# Patient Record
Sex: MALE | Race: WHITE | NOT HISPANIC OR LATINO | Employment: OTHER | URBAN - NONMETROPOLITAN AREA
[De-identification: names, ages, dates, MRNs, and addresses within clinical notes are randomized per-mention and may not be internally consistent; named-entity substitution may affect disease eponyms.]

---

## 2017-01-11 RX ORDER — SIMVASTATIN 40 MG
TABLET ORAL
Qty: 90 TABLET | Refills: 2 | Status: SHIPPED | OUTPATIENT
Start: 2017-01-11 | End: 2018-01-26 | Stop reason: SDUPTHER

## 2017-01-11 RX ORDER — LISINOPRIL 5 MG/1
TABLET ORAL
Qty: 30 TABLET | Refills: 2 | Status: SHIPPED | OUTPATIENT
Start: 2017-01-11 | End: 2017-04-15 | Stop reason: SDUPTHER

## 2017-03-22 ENCOUNTER — PREP FOR SURGERY (OUTPATIENT)
Dept: CARDIOLOGY | Facility: CLINIC | Age: 67
End: 2017-03-22

## 2017-03-22 ENCOUNTER — OFFICE VISIT (OUTPATIENT)
Dept: CARDIOLOGY | Facility: CLINIC | Age: 67
End: 2017-03-22

## 2017-03-22 ENCOUNTER — HOSPITAL ENCOUNTER (OUTPATIENT)
Facility: HOSPITAL | Age: 67
Setting detail: HOSPITAL OUTPATIENT SURGERY
End: 2017-03-22
Attending: INTERNAL MEDICINE | Admitting: INTERNAL MEDICINE

## 2017-03-22 VITALS
HEIGHT: 66 IN | HEART RATE: 59 BPM | SYSTOLIC BLOOD PRESSURE: 128 MMHG | BODY MASS INDEX: 36 KG/M2 | DIASTOLIC BLOOD PRESSURE: 80 MMHG | WEIGHT: 224 LBS

## 2017-03-22 DIAGNOSIS — I10 ESSENTIAL HYPERTENSION: ICD-10-CM

## 2017-03-22 DIAGNOSIS — I20.0 UNSTABLE ANGINA (HCC): ICD-10-CM

## 2017-03-22 DIAGNOSIS — R07.2 PRECORDIAL PAIN: ICD-10-CM

## 2017-03-22 DIAGNOSIS — I25.2 OLD MYOCARDIAL INFARCT: ICD-10-CM

## 2017-03-22 DIAGNOSIS — R00.1 SINUS BRADYCARDIA: ICD-10-CM

## 2017-03-22 DIAGNOSIS — I20.0 UNSTABLE ANGINA (HCC): Primary | ICD-10-CM

## 2017-03-22 DIAGNOSIS — I25.10 CHRONIC CORONARY ARTERY DISEASE: Primary | ICD-10-CM

## 2017-03-22 PROCEDURE — 93000 ELECTROCARDIOGRAM COMPLETE: CPT | Performed by: INTERNAL MEDICINE

## 2017-03-22 PROCEDURE — 99214 OFFICE O/P EST MOD 30 MIN: CPT | Performed by: INTERNAL MEDICINE

## 2017-03-22 RX ORDER — FAMOTIDINE 10 MG
20 TABLET ORAL ONCE
Status: CANCELLED | OUTPATIENT
Start: 2017-03-22 | End: 2017-03-22

## 2017-03-22 RX ORDER — ISOSORBIDE MONONITRATE 30 MG/1
30 TABLET, EXTENDED RELEASE ORAL EVERY MORNING
Qty: 30 TABLET | Refills: 6 | Status: SHIPPED | OUTPATIENT
Start: 2017-03-22 | End: 2017-05-03

## 2017-03-22 RX ORDER — DIPHENHYDRAMINE HCL 25 MG
50 CAPSULE ORAL 2 TIMES DAILY
Status: CANCELLED | OUTPATIENT
Start: 2017-03-22 | End: 2017-03-23

## 2017-03-22 RX ORDER — PREDNISONE 1 MG/1
60 TABLET ORAL 2 TIMES DAILY
Status: CANCELLED | OUTPATIENT
Start: 2017-03-29 | End: 2017-03-30

## 2017-03-22 RX ORDER — SODIUM CHLORIDE 9 MG/ML
125 INJECTION, SOLUTION INTRAVENOUS CONTINUOUS
Status: CANCELLED | OUTPATIENT
Start: 2017-03-22

## 2017-03-22 RX ORDER — SODIUM CHLORIDE 9 MG/ML
125 INJECTION, SOLUTION INTRAVENOUS CONTINUOUS
Status: CANCELLED | OUTPATIENT
Start: 2017-03-29

## 2017-03-22 RX ORDER — SODIUM CHLORIDE 0.9 % (FLUSH) 0.9 %
1-10 SYRINGE (ML) INJECTION AS NEEDED
Status: CANCELLED | OUTPATIENT
Start: 2017-03-22

## 2017-03-22 RX ORDER — PREDNISONE 20 MG/1
20 TABLET ORAL DAILY
Qty: 6 TABLET | Refills: 1 | Status: SHIPPED | OUTPATIENT
Start: 2017-03-22 | End: 2017-03-30 | Stop reason: HOSPADM

## 2017-03-22 NOTE — PROGRESS NOTES
Justin Baer  66 y.o. male    03/22/2017  1. Chronic coronary artery disease    2. Essential hypertension    3. Sinus bradycardia    4. Old myocardial infarct    5. Precordial pain        History of Present Illness    Mr. Baer is here for follow-up of his above stated problems.  He reports intermittent episodes of chest pain during the past week which occurs at rest and remind similar to pain that he has had with angina in the past.  The pain at times radiates to his neck.  There is some degree of shortness of breath on exertion with no PND or orthopnea.  He has been compliant with his medications.  His history was reviewed in detail and he has had intervention to left anterior deciding coronary artery and left circumflex coronary artery in the past.  His last intervention was in March 2013 when he underwent dilation of the ostial circumflex stent with a noncompliant balloon by me.  He has done reasonably well on medical management for the past 4 years.  He denied any abdominal pain, nausea, vomiting, diarrhea or melena.  Blood pressure was in the normal range.  EKG showed sinus rhythm heart rate of 59 bpm with minimal nonspecific T-wave changes.        SUBJECTIVE    Allergies   Allergen Reactions   • Anaprox [Naproxen Sodium]    • Ibuprofen    • Iodinated Diagnostic Agents    • Iodine          Past Medical History:   Diagnosis Date   • Angina pectoris    • Dyspnea    • Essential hypertension, malignant    • Hyperlipidemia    • Kidney stone    • Myocardial infarction    • Palpitations    • Renal stones    • Ventricular fibrillation          Past Surgical History:   Procedure Laterality Date   • APPENDECTOMY     • CARDIAC CATHETERIZATION     • CORONARY ANGIOPLASTY     • CORONARY STENT PLACEMENT     • HERNIA REPAIR     • KIDNEY STONE SURGERY     • VASECTOMY           No family history on file.      Social History     Social History   • Marital status:      Spouse name: N/A   • Number of children: N/A   •  "Years of education: N/A     Occupational History   • Not on file.     Social History Main Topics   • Smoking status: Former Smoker   • Smokeless tobacco: Never Used   • Alcohol use No   • Drug use: No   • Sexual activity: Defer     Other Topics Concern   • Not on file     Social History Narrative         Current Outpatient Prescriptions   Medication Sig Dispense Refill   • aspirin 81 MG EC tablet Take 81 mg by mouth Daily.     • BRILINTA 90 MG tablet tablet Take 90 mg by mouth 2 (Two) Times a Day.     • lisinopril (PRINIVIL,ZESTRIL) 5 MG tablet TAKE ONE TABLET BY MOUTH DAILY AS NEEDED 30 tablet 2   • pantoprazole (PROTONIX) 40 MG EC tablet Take 40 mg by mouth Daily.     • RANEXA 500 MG 12 hr tablet TAKE ONE TABLET BY MOUTH TWICE A DAY 60 tablet 5   • simvastatin (ZOCOR) 40 MG tablet TAKE ONE TABLET BY MOUTH EVERY EVENING 90 tablet 2   • isosorbide mononitrate (IMDUR) 30 MG 24 hr tablet Take 1 tablet by mouth Every Morning. 30 tablet 6     No current facility-administered medications for this visit.          OBJECTIVE    /80  Pulse 59  Ht 66\" (167.6 cm)  Wt 224 lb (102 kg)  BMI 36.15 kg/m2        Review of Systems     Constitutional:  Denies recent weight loss, weight gain, fever or chills,    HENT: c/o  hearing loss, epistaxis, hoarseness, or difficulty speaking.     Eyes: Wears eyeglasses or contact lenses     Respiratory:  Denies dyspnea with exertion,no cough, wheezing, or hemoptysis.     Cardiovascular: See history of present illness     Gastrointestinal:  Denies change in bowel habits, dyspepsia, ulcer disease, hematochezia, or melena.     Endocrine: Negative for cold intolerance, heat intolerance, polydipsia, polyphagia and polyuria. Denies any history of weight change, heat/cold intolerance, polydipsia, polyuria     Genitourinary: Negative.      Musculoskeletal: Denies any history of arthritic symptoms or back problems     Skin:  Denies any change in hair or nails, rashes, or skin lesions. "     Allergic/Immunologic: Negative.  Negative for environmental allergies, food allergies and immunocompromised state.     Neurological:  Denies any history of recurrent headaches, strokes, TIA, or seizure disorder.     Hematological: Denies any food allergies, seasonal allergies, bleeding disorders, or lymphadenopathy.     Psychiatric/Behavioral: Denies any history of depression, substance abuse, or change in cognitive function.         Physical Exam     Constitutional: Cooperative, alert and oriented, well-developed, well-nourished, in no acute distress.     HENT:   Head: Normocephalic, normal hair patterns, no masses or tenderness.  Ears, Nose, and Throat: No gross abnormalities. No pallor or cyanosis. Dentition good.   Eyes: EOMS intact, PERRL, conjunctivae and lids unremarkable. Fundoscopic exam and visual fields not performed.   Neck: No palpable masses or adenopathy, no thyromegaly, no JVD, carotid pulses are full and equal bilaterally and without  Bruits.     Cardiovascular: Regular rhythm, S1 and S2 normal, no S3 or S4. Apical impulse not displaced. No murmurs, gallops, or rubs detected.     Pulmonary/Chest: Chest: normal symmetry, no tenderness to palpation, normal respiratory excursion, no intercostal retraction, no use of accessory muscles.            Pulmonary: Normal breath sounds. No rales or ronchi.    Abdominal: Abdomen soft, bowel sounds normoactive, no masses, no hepatosplenomegaly, non-tender, no bruits.     Musculoskeletal: No deformities, clubbing, cyanosis, erythema, or edema observed. There are no spinal abnormalities noted. Normal muscle strength and tone. Pulses full and equal in all extremities, no bruits auscultated.     Neurological: No gross motor or sensory deficits noted, affect appropriate, oriented to time, person, place.     Skin: Warm and dry to the touch, no apparent skin lesions or masses noted.     Psychiatric: He has a normal mood and affect. His behavior is normal. Judgment  and thought content normal.           ECG 12 Lead  Date/Time: 3/22/2017 1:44 PM  Performed by: JAGDISH ESPINOSA  Authorized by: JAGDISH ESPINOSA   Rhythm: sinus rhythm  Rate: normal  QRS axis: normal  Comments: EKG showed sinus rhythm heart rate of 59 bpm with minimal nonspecific T-wave changes              Lab Results   Component Value Date    WBC 5.5 10/09/2015    HGB 13.9 10/09/2015    HCT 40.7 10/09/2015    MCV 91.9 10/09/2015     10/09/2015     Lab Results   Component Value Date    GLUCOSE 89 10/09/2015    BUN 16 10/09/2015    CREATININE 0.9 10/09/2015    CO2 29 10/09/2015    CALCIUM 9.7 10/09/2015    ALBUMIN 3.9 10/09/2015    AST 25 10/09/2015    ALT 30 10/09/2015     No results found for: CHOL  Lab Results   Component Value Date    TRIG 93 10/09/2015     No results found for: HDL  Lab Results   Component Value Date    LDLCALC 65 10/09/2015     No results found for: LDL  No results found for: HDLLDLRATIO  No components found for: CHOLHDL  No results found for: HGBA1C  Lab Results   Component Value Date    TSH 1.70 04/10/2015           ASSESSMENT AND PLAN    Mr. Baer has documented coronary artery disease in the background of hypertension and hyperlipidemia and previous intervention to left anterior descending coronary artery and left circumflex coronary artery.  His chest pain is suspicious for unstable angina and I believe that definitive evaluation by cardiac catheterization would be appropriate.  All risks and benefits were explained to him in detail and arrangements are being made for him to have cardiac catheterization next week.  He will be an ASA class II and Mallampati class II.  Dr. Ochoa will be performing the procedure since I will be out of town.  Diagnoses and all orders for this visit:    Chronic coronary artery disease    Essential hypertension    Sinus bradycardia    Old myocardial infarct    Precordial pain    Other orders  -     isosorbide mononitrate (IMDUR) 30  MG 24 hr tablet; Take 1 tablet by mouth Every Morning.        Javi Landry MD  3/22/2017  1:38 PM

## 2017-03-27 ENCOUNTER — HOSPITAL ENCOUNTER (OUTPATIENT)
Facility: HOSPITAL | Age: 67
Setting detail: OBSERVATION
Discharge: HOME | End: 2017-03-30
Attending: EMERGENCY MEDICINE | Admitting: INTERNAL MEDICINE

## 2017-03-27 ENCOUNTER — APPOINTMENT (OUTPATIENT)
Dept: GENERAL RADIOLOGY | Facility: HOSPITAL | Age: 67
End: 2017-03-27

## 2017-03-27 DIAGNOSIS — R07.2 PRECORDIAL PAIN: ICD-10-CM

## 2017-03-27 DIAGNOSIS — R07.9 CHEST PAIN, UNSPECIFIED TYPE: Primary | ICD-10-CM

## 2017-03-27 DIAGNOSIS — I25.10 CHRONIC CORONARY ARTERY DISEASE: ICD-10-CM

## 2017-03-27 DIAGNOSIS — I10 ESSENTIAL HYPERTENSION: ICD-10-CM

## 2017-03-27 LAB
ALBUMIN SERPL-MCNC: 4.3 G/DL (ref 3.4–4.8)
ALBUMIN/GLOB SERPL: 1.5 G/DL (ref 1.1–1.8)
ALP SERPL-CCNC: 100 U/L (ref 38–126)
ALT SERPL W P-5'-P-CCNC: 26 U/L (ref 21–72)
ANION GAP SERPL CALCULATED.3IONS-SCNC: 14 MMOL/L (ref 5–15)
AST SERPL-CCNC: 27 U/L (ref 17–59)
BASOPHILS # BLD AUTO: 0.04 10*3/MM3 (ref 0–0.2)
BASOPHILS NFR BLD AUTO: 0.8 % (ref 0–2)
BILIRUB SERPL-MCNC: 0.7 MG/DL (ref 0.2–1.3)
BUN BLD-MCNC: 19 MG/DL (ref 7–21)
BUN/CREAT SERPL: 20.2 (ref 7–25)
CALCIUM SPEC-SCNC: 9.2 MG/DL (ref 8.4–10.2)
CHLORIDE SERPL-SCNC: 102 MMOL/L (ref 95–110)
CO2 SERPL-SCNC: 25 MMOL/L (ref 22–31)
CREAT BLD-MCNC: 0.94 MG/DL (ref 0.7–1.3)
DEPRECATED RDW RBC AUTO: 43.5 FL (ref 35.1–43.9)
EOSINOPHIL # BLD AUTO: 0.09 10*3/MM3 (ref 0–0.7)
EOSINOPHIL NFR BLD AUTO: 1.7 % (ref 0–7)
ERYTHROCYTE [DISTWIDTH] IN BLOOD BY AUTOMATED COUNT: 13 % (ref 11.5–14.5)
GFR SERPL CREATININE-BSD FRML MDRD: 80 ML/MIN/1.73 (ref 49–113)
GLOBULIN UR ELPH-MCNC: 2.9 GM/DL (ref 2.3–3.5)
GLUCOSE BLD-MCNC: 87 MG/DL (ref 60–100)
HCT VFR BLD AUTO: 41.5 % (ref 39–49)
HGB BLD-MCNC: 14.2 G/DL (ref 13.7–17.3)
HOLD SPECIMEN: NORMAL
HOLD SPECIMEN: NORMAL
IMM GRANULOCYTES # BLD: 0.01 10*3/MM3 (ref 0–0.02)
IMM GRANULOCYTES NFR BLD: 0.2 % (ref 0–0.5)
INR PPP: 1.01 (ref 0.8–1.2)
LYMPHOCYTES # BLD AUTO: 2.02 10*3/MM3 (ref 0.6–4.2)
LYMPHOCYTES NFR BLD AUTO: 38.7 % (ref 10–50)
MCH RBC QN AUTO: 31.4 PG (ref 26.5–34)
MCHC RBC AUTO-ENTMCNC: 34.2 G/DL (ref 31.5–36.3)
MCV RBC AUTO: 91.8 FL (ref 80–98)
MONOCYTES # BLD AUTO: 0.63 10*3/MM3 (ref 0–0.9)
MONOCYTES NFR BLD AUTO: 12.1 % (ref 0–12)
NEUTROPHILS # BLD AUTO: 2.43 10*3/MM3 (ref 2–8.6)
NEUTROPHILS NFR BLD AUTO: 46.5 % (ref 37–80)
NT-PROBNP SERPL-MCNC: 72.9 PG/ML (ref 0–900)
PLATELET # BLD AUTO: 205 10*3/MM3 (ref 150–450)
PMV BLD AUTO: 11.2 FL (ref 8–12)
POTASSIUM BLD-SCNC: 4.2 MMOL/L (ref 3.5–5.1)
PROT SERPL-MCNC: 7.2 G/DL (ref 6.3–8.6)
PROTHROMBIN TIME: 13.2 SECONDS (ref 11.1–15.3)
RBC # BLD AUTO: 4.52 10*6/MM3 (ref 4.37–5.74)
SODIUM BLD-SCNC: 141 MMOL/L (ref 137–145)
TROPONIN I SERPL-MCNC: <0.012 NG/ML
TSH SERPL DL<=0.05 MIU/L-ACNC: 2.9 MIU/ML (ref 0.46–4.68)
WBC NRBC COR # BLD: 5.22 10*3/MM3 (ref 3.2–9.8)
WHOLE BLOOD HOLD SPECIMEN: NORMAL
WHOLE BLOOD HOLD SPECIMEN: NORMAL

## 2017-03-27 PROCEDURE — G0378 HOSPITAL OBSERVATION PER HR: HCPCS

## 2017-03-27 PROCEDURE — 93010 ELECTROCARDIOGRAM REPORT: CPT | Performed by: INTERNAL MEDICINE

## 2017-03-27 PROCEDURE — 99284 EMERGENCY DEPT VISIT MOD MDM: CPT

## 2017-03-27 PROCEDURE — 84484 ASSAY OF TROPONIN QUANT: CPT | Performed by: NURSE PRACTITIONER

## 2017-03-27 PROCEDURE — 85025 COMPLETE CBC W/AUTO DIFF WBC: CPT | Performed by: NURSE PRACTITIONER

## 2017-03-27 PROCEDURE — 80053 COMPREHEN METABOLIC PANEL: CPT | Performed by: NURSE PRACTITIONER

## 2017-03-27 PROCEDURE — 84443 ASSAY THYROID STIM HORMONE: CPT | Performed by: NURSE PRACTITIONER

## 2017-03-27 PROCEDURE — 93005 ELECTROCARDIOGRAM TRACING: CPT | Performed by: NURSE PRACTITIONER

## 2017-03-27 PROCEDURE — 83880 ASSAY OF NATRIURETIC PEPTIDE: CPT | Performed by: NURSE PRACTITIONER

## 2017-03-27 PROCEDURE — 85610 PROTHROMBIN TIME: CPT | Performed by: NURSE PRACTITIONER

## 2017-03-27 PROCEDURE — 71020 HC CHEST PA AND LATERAL: CPT

## 2017-03-27 PROCEDURE — 93005 ELECTROCARDIOGRAM TRACING: CPT

## 2017-03-27 RX ORDER — DIPHENHYDRAMINE HCL 25 MG
50 TABLET ORAL AS NEEDED
Status: DISCONTINUED | OUTPATIENT
Start: 2017-03-27 | End: 2017-03-29

## 2017-03-27 RX ORDER — ATORVASTATIN CALCIUM 20 MG/1
20 TABLET, FILM COATED ORAL DAILY
Status: DISCONTINUED | OUTPATIENT
Start: 2017-03-27 | End: 2017-03-30 | Stop reason: HOSPADM

## 2017-03-27 RX ORDER — ASPIRIN 81 MG/1
81 TABLET ORAL DAILY
Status: DISCONTINUED | OUTPATIENT
Start: 2017-03-27 | End: 2017-03-30 | Stop reason: HOSPADM

## 2017-03-27 RX ORDER — NALOXONE HCL 0.4 MG/ML
0.4 VIAL (ML) INJECTION
Status: DISCONTINUED | OUTPATIENT
Start: 2017-03-27 | End: 2017-03-29

## 2017-03-27 RX ORDER — ISOSORBIDE MONONITRATE 30 MG/1
30 TABLET, EXTENDED RELEASE ORAL EVERY MORNING
Status: DISCONTINUED | OUTPATIENT
Start: 2017-03-28 | End: 2017-03-30 | Stop reason: HOSPADM

## 2017-03-27 RX ORDER — MORPHINE SULFATE 2 MG/ML
1 INJECTION, SOLUTION INTRAMUSCULAR; INTRAVENOUS EVERY 4 HOURS PRN
Status: DISCONTINUED | OUTPATIENT
Start: 2017-03-27 | End: 2017-03-29

## 2017-03-27 RX ORDER — ONDANSETRON 2 MG/ML
4 INJECTION INTRAMUSCULAR; INTRAVENOUS EVERY 6 HOURS PRN
Status: DISCONTINUED | OUTPATIENT
Start: 2017-03-27 | End: 2017-03-30 | Stop reason: HOSPADM

## 2017-03-27 RX ORDER — ONDANSETRON 4 MG/1
4 TABLET, FILM COATED ORAL EVERY 6 HOURS PRN
Status: DISCONTINUED | OUTPATIENT
Start: 2017-03-27 | End: 2017-03-30 | Stop reason: HOSPADM

## 2017-03-27 RX ORDER — ONDANSETRON 4 MG/1
4 TABLET, ORALLY DISINTEGRATING ORAL EVERY 6 HOURS PRN
Status: DISCONTINUED | OUTPATIENT
Start: 2017-03-27 | End: 2017-03-30 | Stop reason: HOSPADM

## 2017-03-27 RX ORDER — SODIUM CHLORIDE 0.9 % (FLUSH) 0.9 %
1-10 SYRINGE (ML) INJECTION AS NEEDED
Status: DISCONTINUED | OUTPATIENT
Start: 2017-03-27 | End: 2017-03-30 | Stop reason: HOSPADM

## 2017-03-27 RX ORDER — PANTOPRAZOLE SODIUM 40 MG/1
40 TABLET, DELAYED RELEASE ORAL DAILY
Status: DISCONTINUED | OUTPATIENT
Start: 2017-03-27 | End: 2017-03-28

## 2017-03-27 RX ORDER — RANOLAZINE 500 MG/1
500 TABLET, EXTENDED RELEASE ORAL EVERY 12 HOURS SCHEDULED
Status: DISCONTINUED | OUTPATIENT
Start: 2017-03-27 | End: 2017-03-30 | Stop reason: HOSPADM

## 2017-03-27 RX ORDER — PREDNISONE 20 MG/1
20 TABLET ORAL DAILY
Status: DISCONTINUED | OUTPATIENT
Start: 2017-03-27 | End: 2017-03-28

## 2017-03-27 RX ORDER — LISINOPRIL 5 MG/1
5 TABLET ORAL
Status: DISCONTINUED | OUTPATIENT
Start: 2017-03-27 | End: 2017-03-30 | Stop reason: HOSPADM

## 2017-03-27 RX ORDER — ASPIRIN 81 MG/1
324 TABLET, CHEWABLE ORAL ONCE
Status: COMPLETED | OUTPATIENT
Start: 2017-03-27 | End: 2017-03-27

## 2017-03-27 RX ORDER — NITROGLYCERIN 0.4 MG/1
0.4 TABLET SUBLINGUAL
Status: DISCONTINUED | OUTPATIENT
Start: 2017-03-27 | End: 2017-03-30 | Stop reason: HOSPADM

## 2017-03-27 RX ORDER — SODIUM CHLORIDE 0.9 % (FLUSH) 0.9 %
10 SYRINGE (ML) INJECTION AS NEEDED
Status: DISCONTINUED | OUTPATIENT
Start: 2017-03-27 | End: 2017-03-30 | Stop reason: HOSPADM

## 2017-03-27 RX ORDER — ACETAMINOPHEN 325 MG/1
650 TABLET ORAL EVERY 4 HOURS PRN
Status: DISCONTINUED | OUTPATIENT
Start: 2017-03-27 | End: 2017-03-30 | Stop reason: HOSPADM

## 2017-03-27 RX ADMIN — ASPIRIN 81 MG 324 MG: 81 TABLET ORAL at 17:26

## 2017-03-27 RX ADMIN — PANTOPRAZOLE SODIUM 40 MG: 40 TABLET, DELAYED RELEASE ORAL at 20:56

## 2017-03-27 RX ADMIN — TICAGRELOR 90 MG: 90 TABLET ORAL at 20:57

## 2017-03-27 RX ADMIN — RANOLAZINE 500 MG: 500 TABLET, FILM COATED, EXTENDED RELEASE ORAL at 20:57

## 2017-03-28 PROCEDURE — 93010 ELECTROCARDIOGRAM REPORT: CPT | Performed by: INTERNAL MEDICINE

## 2017-03-28 PROCEDURE — 99219 PR INITIAL OBSERVATION CARE/DAY 50 MINUTES: CPT | Performed by: INTERNAL MEDICINE

## 2017-03-28 PROCEDURE — 93005 ELECTROCARDIOGRAM TRACING: CPT | Performed by: INTERNAL MEDICINE

## 2017-03-28 PROCEDURE — 63710000001 PREDNISONE PER 1 MG: Performed by: INTERNAL MEDICINE

## 2017-03-28 PROCEDURE — G0378 HOSPITAL OBSERVATION PER HR: HCPCS

## 2017-03-28 PROCEDURE — 63710000001 DIPHENHYDRAMINE PER 50 MG: Performed by: NURSE PRACTITIONER

## 2017-03-28 RX ORDER — PREDNISONE 20 MG/1
20 TABLET ORAL
Status: COMPLETED | OUTPATIENT
Start: 2017-03-28 | End: 2017-03-28

## 2017-03-28 RX ORDER — PREDNISONE 20 MG/1
20 TABLET ORAL DAILY
Status: DISCONTINUED | OUTPATIENT
Start: 2017-03-29 | End: 2017-03-29

## 2017-03-28 RX ORDER — PANTOPRAZOLE SODIUM 40 MG/1
40 TABLET, DELAYED RELEASE ORAL DAILY
Status: COMPLETED | OUTPATIENT
Start: 2017-03-28 | End: 2017-03-28

## 2017-03-28 RX ADMIN — TICAGRELOR 90 MG: 90 TABLET ORAL at 09:13

## 2017-03-28 RX ADMIN — PANTOPRAZOLE SODIUM 40 MG: 40 TABLET, DELAYED RELEASE ORAL at 17:52

## 2017-03-28 RX ADMIN — TICAGRELOR 90 MG: 90 TABLET ORAL at 17:52

## 2017-03-28 RX ADMIN — PREDNISONE 20 MG: 20 TABLET ORAL at 17:52

## 2017-03-28 RX ADMIN — ASPIRIN 81 MG: 81 TABLET, COATED ORAL at 09:13

## 2017-03-28 RX ADMIN — RANOLAZINE 500 MG: 500 TABLET, FILM COATED, EXTENDED RELEASE ORAL at 09:13

## 2017-03-28 RX ADMIN — RANOLAZINE 500 MG: 500 TABLET, FILM COATED, EXTENDED RELEASE ORAL at 20:29

## 2017-03-28 RX ADMIN — LISINOPRIL 5 MG: 5 TABLET ORAL at 09:13

## 2017-03-28 RX ADMIN — DIPHENHYDRAMINE HCL 50 MG: 25 TABLET ORAL at 17:52

## 2017-03-29 PROCEDURE — 94799 UNLISTED PULMONARY SVC/PX: CPT

## 2017-03-29 PROCEDURE — 25010000002 HEPARIN (PORCINE) PER 1000 UNITS: Performed by: INTERNAL MEDICINE

## 2017-03-29 PROCEDURE — 25010000002 HYDROCORTISONE SODIUM SUCCINATE 100 MG RECONSTITUTED SOLUTION: Performed by: INTERNAL MEDICINE

## 2017-03-29 PROCEDURE — 25010000002 MIDAZOLAM PER 1 MG: Performed by: INTERNAL MEDICINE

## 2017-03-29 PROCEDURE — C1887 CATHETER, GUIDING: HCPCS | Performed by: INTERNAL MEDICINE

## 2017-03-29 PROCEDURE — 93454 CORONARY ARTERY ANGIO S&I: CPT | Performed by: INTERNAL MEDICINE

## 2017-03-29 PROCEDURE — C1725 CATH, TRANSLUMIN NON-LASER: HCPCS | Performed by: INTERNAL MEDICINE

## 2017-03-29 PROCEDURE — 92928 PRQ TCAT PLMT NTRAC ST 1 LES: CPT | Performed by: INTERNAL MEDICINE

## 2017-03-29 PROCEDURE — G0378 HOSPITAL OBSERVATION PER HR: HCPCS

## 2017-03-29 PROCEDURE — C1894 INTRO/SHEATH, NON-LASER: HCPCS | Performed by: INTERNAL MEDICINE

## 2017-03-29 PROCEDURE — 93571 IV DOP VEL&/PRESS C FLO 1ST: CPT | Performed by: INTERNAL MEDICINE

## 2017-03-29 PROCEDURE — 25010000002 FENTANYL CITRATE (PF) 100 MCG/2ML SOLUTION: Performed by: INTERNAL MEDICINE

## 2017-03-29 PROCEDURE — 0 IOPAMIDOL PER 1 ML: Performed by: INTERNAL MEDICINE

## 2017-03-29 PROCEDURE — 93005 ELECTROCARDIOGRAM TRACING: CPT | Performed by: INTERNAL MEDICINE

## 2017-03-29 PROCEDURE — 63710000001 DIPHENHYDRAMINE PER 50 MG: Performed by: NURSE PRACTITIONER

## 2017-03-29 PROCEDURE — 93010 ELECTROCARDIOGRAM REPORT: CPT | Performed by: INTERNAL MEDICINE

## 2017-03-29 PROCEDURE — 25010000002 DIPHENHYDRAMINE PER 50 MG: Performed by: INTERNAL MEDICINE

## 2017-03-29 PROCEDURE — C1769 GUIDE WIRE: HCPCS | Performed by: INTERNAL MEDICINE

## 2017-03-29 PROCEDURE — C1874 STENT, COATED/COV W/DEL SYS: HCPCS | Performed by: INTERNAL MEDICINE

## 2017-03-29 PROCEDURE — C9600 PERC DRUG-EL COR STENT SING: HCPCS | Performed by: INTERNAL MEDICINE

## 2017-03-29 PROCEDURE — 25010000002 ADENOSINE (DIAGNOSTIC) PER 30 MG: Performed by: INTERNAL MEDICINE

## 2017-03-29 PROCEDURE — 63710000001 PREDNISONE PER 1 MG: Performed by: FAMILY MEDICINE

## 2017-03-29 DEVICE — XIENCE ALPINE EVEROLIMUS ELUTING CORONARY STENT SYSTEM 3.00 MM X 08 MM / RAPID-EXCHANGE
Type: IMPLANTABLE DEVICE | Site: CORONARY | Status: FUNCTIONAL
Brand: XIENCE ALPINE

## 2017-03-29 RX ORDER — PREDNISONE 20 MG/1
20 TABLET ORAL DAILY
Status: COMPLETED | OUTPATIENT
Start: 2017-03-29 | End: 2017-03-29

## 2017-03-29 RX ORDER — MIDAZOLAM HYDROCHLORIDE 1 MG/ML
INJECTION INTRAMUSCULAR; INTRAVENOUS AS NEEDED
Status: DISCONTINUED | OUTPATIENT
Start: 2017-03-29 | End: 2017-03-29 | Stop reason: HOSPADM

## 2017-03-29 RX ORDER — SODIUM CHLORIDE 9 MG/ML
100 INJECTION, SOLUTION INTRAVENOUS CONTINUOUS
Status: DISCONTINUED | OUTPATIENT
Start: 2017-03-29 | End: 2017-03-30 | Stop reason: HOSPADM

## 2017-03-29 RX ORDER — DIPHENHYDRAMINE HYDROCHLORIDE 50 MG/ML
25 INJECTION INTRAMUSCULAR; INTRAVENOUS ONCE
Status: COMPLETED | OUTPATIENT
Start: 2017-03-29 | End: 2017-03-29

## 2017-03-29 RX ORDER — FENTANYL CITRATE 50 UG/ML
INJECTION, SOLUTION INTRAMUSCULAR; INTRAVENOUS AS NEEDED
Status: DISCONTINUED | OUTPATIENT
Start: 2017-03-29 | End: 2017-03-29 | Stop reason: HOSPADM

## 2017-03-29 RX ORDER — PREDNISONE 20 MG/1
40 TABLET ORAL DAILY
Status: DISCONTINUED | OUTPATIENT
Start: 2017-03-29 | End: 2017-03-29

## 2017-03-29 RX ORDER — SODIUM CHLORIDE 9 MG/ML
1-3 INJECTION, SOLUTION INTRAVENOUS CONTINUOUS
Status: DISCONTINUED | OUTPATIENT
Start: 2017-03-29 | End: 2017-03-29

## 2017-03-29 RX ORDER — FAMOTIDINE 20 MG/1
20 TABLET, FILM COATED ORAL ONCE
Status: DISCONTINUED | OUTPATIENT
Start: 2017-03-29 | End: 2017-03-29 | Stop reason: HOSPADM

## 2017-03-29 RX ORDER — SODIUM CHLORIDE 9 MG/ML
100 INJECTION, SOLUTION INTRAVENOUS CONTINUOUS
Status: DISCONTINUED | OUTPATIENT
Start: 2017-03-29 | End: 2017-03-29

## 2017-03-29 RX ORDER — LIDOCAINE HYDROCHLORIDE 20 MG/ML
INJECTION, SOLUTION INFILTRATION; PERINEURAL AS NEEDED
Status: DISCONTINUED | OUTPATIENT
Start: 2017-03-29 | End: 2017-03-29 | Stop reason: HOSPADM

## 2017-03-29 RX ORDER — HEPARIN SODIUM 1000 [USP'U]/ML
INJECTION, SOLUTION INTRAVENOUS; SUBCUTANEOUS AS NEEDED
Status: DISCONTINUED | OUTPATIENT
Start: 2017-03-29 | End: 2017-03-29 | Stop reason: HOSPADM

## 2017-03-29 RX ADMIN — RANOLAZINE 500 MG: 500 TABLET, FILM COATED, EXTENDED RELEASE ORAL at 08:16

## 2017-03-29 RX ADMIN — HYDROCORTISONE SODIUM SUCCINATE 100 MG: 100 INJECTION, POWDER, FOR SOLUTION INTRAMUSCULAR; INTRAVENOUS at 09:24

## 2017-03-29 RX ADMIN — RANOLAZINE 500 MG: 500 TABLET, FILM COATED, EXTENDED RELEASE ORAL at 20:33

## 2017-03-29 RX ADMIN — TICAGRELOR 90 MG: 90 TABLET ORAL at 17:59

## 2017-03-29 RX ADMIN — DIPHENHYDRAMINE HYDROCHLORIDE 25 MG: 50 INJECTION INTRAMUSCULAR; INTRAVENOUS at 09:27

## 2017-03-29 RX ADMIN — LISINOPRIL 5 MG: 5 TABLET ORAL at 08:16

## 2017-03-29 RX ADMIN — DIPHENHYDRAMINE HCL 50 MG: 25 TABLET ORAL at 06:16

## 2017-03-29 RX ADMIN — SODIUM CHLORIDE 100 ML/HR: 900 INJECTION, SOLUTION INTRAVENOUS at 12:59

## 2017-03-29 RX ADMIN — PREDNISONE 20 MG: 20 TABLET ORAL at 06:16

## 2017-03-30 VITALS
WEIGHT: 220 LBS | HEART RATE: 61 BPM | TEMPERATURE: 97.7 F | RESPIRATION RATE: 18 BRPM | SYSTOLIC BLOOD PRESSURE: 108 MMHG | BODY MASS INDEX: 35.36 KG/M2 | HEIGHT: 66 IN | OXYGEN SATURATION: 97 % | DIASTOLIC BLOOD PRESSURE: 64 MMHG

## 2017-03-30 PROBLEM — R07.9 CHEST PAIN: Status: RESOLVED | Noted: 2017-03-27 | Resolved: 2017-03-30

## 2017-03-30 PROBLEM — Z98.61 S/P PTCA (PERCUTANEOUS TRANSLUMINAL CORONARY ANGIOPLASTY): Status: ACTIVE | Noted: 2017-03-30

## 2017-03-30 LAB
ANION GAP SERPL CALCULATED.3IONS-SCNC: 10 MMOL/L (ref 5–15)
ARTICHOKE IGE QN: 56 MG/DL (ref 1–129)
BUN BLD-MCNC: 22 MG/DL (ref 7–21)
BUN/CREAT SERPL: 23.4 (ref 7–25)
CALCIUM SPEC-SCNC: 8.4 MG/DL (ref 8.4–10.2)
CHLORIDE SERPL-SCNC: 108 MMOL/L (ref 95–110)
CHOLEST SERPL-MCNC: 125 MG/DL (ref 0–199)
CO2 SERPL-SCNC: 23 MMOL/L (ref 22–31)
CREAT BLD-MCNC: 0.94 MG/DL (ref 0.7–1.3)
DEPRECATED RDW RBC AUTO: 42.5 FL (ref 35.1–43.9)
ERYTHROCYTE [DISTWIDTH] IN BLOOD BY AUTOMATED COUNT: 12.9 % (ref 11.5–14.5)
GFR SERPL CREATININE-BSD FRML MDRD: 80 ML/MIN/1.73 (ref 49–113)
GLUCOSE BLD-MCNC: 102 MG/DL (ref 60–100)
HCT VFR BLD AUTO: 35.6 % (ref 39–49)
HDLC SERPL-MCNC: 47 MG/DL (ref 60–200)
HGB BLD-MCNC: 12.6 G/DL (ref 13.7–17.3)
LDLC/HDLC SERPL: 1.41 {RATIO} (ref 0–3.55)
MCH RBC QN AUTO: 32.2 PG (ref 26.5–34)
MCHC RBC AUTO-ENTMCNC: 35.4 G/DL (ref 31.5–36.3)
MCV RBC AUTO: 91 FL (ref 80–98)
PLATELET # BLD AUTO: 160 10*3/MM3 (ref 150–450)
PMV BLD AUTO: 10.8 FL (ref 8–12)
POTASSIUM BLD-SCNC: 3.6 MMOL/L (ref 3.5–5.1)
RBC # BLD AUTO: 3.91 10*6/MM3 (ref 4.37–5.74)
SODIUM BLD-SCNC: 141 MMOL/L (ref 137–145)
TRIGL SERPL-MCNC: 59 MG/DL (ref 20–199)
WBC NRBC COR # BLD: 9.29 10*3/MM3 (ref 3.2–9.8)

## 2017-03-30 PROCEDURE — G0378 HOSPITAL OBSERVATION PER HR: HCPCS

## 2017-03-30 PROCEDURE — 85027 COMPLETE CBC AUTOMATED: CPT | Performed by: INTERNAL MEDICINE

## 2017-03-30 PROCEDURE — 80061 LIPID PANEL: CPT | Performed by: INTERNAL MEDICINE

## 2017-03-30 PROCEDURE — 80048 BASIC METABOLIC PNL TOTAL CA: CPT | Performed by: INTERNAL MEDICINE

## 2017-03-30 RX ORDER — NITROGLYCERIN 0.4 MG/1
0.4 TABLET SUBLINGUAL
Qty: 20 TABLET | Refills: 11 | Status: SHIPPED | OUTPATIENT
Start: 2017-03-30

## 2017-03-30 RX ADMIN — TICAGRELOR 90 MG: 90 TABLET ORAL at 09:17

## 2017-03-30 RX ADMIN — SODIUM CHLORIDE 100 ML/HR: 900 INJECTION, SOLUTION INTRAVENOUS at 00:47

## 2017-03-30 RX ADMIN — RANOLAZINE 500 MG: 500 TABLET, FILM COATED, EXTENDED RELEASE ORAL at 09:17

## 2017-03-30 RX ADMIN — ASPIRIN 81 MG: 81 TABLET, COATED ORAL at 09:16

## 2017-03-30 RX ADMIN — LISINOPRIL 5 MG: 5 TABLET ORAL at 09:17

## 2017-03-30 RX ADMIN — ISOSORBIDE MONONITRATE 30 MG: 30 TABLET, EXTENDED RELEASE ORAL at 09:17

## 2017-04-05 ENCOUNTER — DOCUMENTATION (OUTPATIENT)
Dept: CARDIOLOGY | Facility: CLINIC | Age: 67
End: 2017-04-05

## 2017-04-05 NOTE — PROGRESS NOTES
PTs wife called with questions r/t post PTCA restriction, information given, verbalized understanding

## 2017-04-07 RX ORDER — TICAGRELOR 90 MG/1
TABLET ORAL
Qty: 180 TABLET | Refills: 2 | Status: SHIPPED | OUTPATIENT
Start: 2017-04-07 | End: 2018-04-19 | Stop reason: SDUPTHER

## 2017-04-17 RX ORDER — LISINOPRIL 5 MG/1
TABLET ORAL
Qty: 30 TABLET | Refills: 1 | Status: SHIPPED | OUTPATIENT
Start: 2017-04-17 | End: 2017-06-12 | Stop reason: SDUPTHER

## 2017-05-03 ENCOUNTER — OFFICE VISIT (OUTPATIENT)
Dept: CARDIOLOGY | Facility: CLINIC | Age: 67
End: 2017-05-03

## 2017-05-03 VITALS
HEIGHT: 66 IN | BODY MASS INDEX: 35.36 KG/M2 | HEART RATE: 56 BPM | SYSTOLIC BLOOD PRESSURE: 108 MMHG | DIASTOLIC BLOOD PRESSURE: 64 MMHG | WEIGHT: 220 LBS

## 2017-05-03 DIAGNOSIS — Z98.61 S/P PTCA (PERCUTANEOUS TRANSLUMINAL CORONARY ANGIOPLASTY): ICD-10-CM

## 2017-05-03 DIAGNOSIS — R07.2 PRECORDIAL PAIN: ICD-10-CM

## 2017-05-03 DIAGNOSIS — I25.10 CHRONIC CORONARY ARTERY DISEASE: Primary | ICD-10-CM

## 2017-05-03 DIAGNOSIS — I25.10 CORONARY ARTERY DISEASE INVOLVING NATIVE CORONARY ARTERY OF NATIVE HEART WITHOUT ANGINA PECTORIS: Primary | ICD-10-CM

## 2017-05-03 DIAGNOSIS — I20.0 UNSTABLE ANGINA (HCC): ICD-10-CM

## 2017-05-03 PROCEDURE — 99213 OFFICE O/P EST LOW 20 MIN: CPT | Performed by: INTERNAL MEDICINE

## 2017-05-16 ENCOUNTER — DOCUMENTATION (OUTPATIENT)
Dept: CARDIAC REHAB | Facility: HOSPITAL | Age: 67
End: 2017-05-16

## 2017-05-22 ENCOUNTER — TELEPHONE (OUTPATIENT)
Dept: CARDIOLOGY | Facility: CLINIC | Age: 67
End: 2017-05-22

## 2017-05-24 ENCOUNTER — HOSPITAL ENCOUNTER (OUTPATIENT)
Dept: CARDIAC REHAB | Facility: HOSPITAL | Age: 67
Setting detail: THERAPIES SERIES
Discharge: HOME | End: 2017-05-24

## 2017-05-24 VITALS
HEIGHT: 66 IN | BODY MASS INDEX: 35.52 KG/M2 | DIASTOLIC BLOOD PRESSURE: 68 MMHG | SYSTOLIC BLOOD PRESSURE: 137 MMHG | WEIGHT: 221 LBS | HEART RATE: 52 BPM

## 2017-05-24 DIAGNOSIS — Z98.61 POST PTCA: Primary | ICD-10-CM

## 2017-05-24 PROCEDURE — 93798 PHYS/QHP OP CAR RHAB W/ECG: CPT

## 2017-05-26 ENCOUNTER — HOSPITAL ENCOUNTER (OUTPATIENT)
Dept: CARDIAC REHAB | Facility: HOSPITAL | Age: 67
Setting detail: THERAPIES SERIES
Discharge: HOME | End: 2017-05-26

## 2017-05-26 VITALS — SYSTOLIC BLOOD PRESSURE: 133 MMHG | DIASTOLIC BLOOD PRESSURE: 69 MMHG | HEART RATE: 59 BPM

## 2017-05-26 DIAGNOSIS — Z98.61 POST PTCA: Primary | ICD-10-CM

## 2017-05-31 ENCOUNTER — HOSPITAL ENCOUNTER (OUTPATIENT)
Dept: CARDIAC REHAB | Facility: HOSPITAL | Age: 67
Setting detail: THERAPIES SERIES
Discharge: HOME | End: 2017-05-31

## 2017-05-31 VITALS
SYSTOLIC BLOOD PRESSURE: 126 MMHG | HEART RATE: 54 BPM | WEIGHT: 219.5 LBS | DIASTOLIC BLOOD PRESSURE: 73 MMHG | BODY MASS INDEX: 35.43 KG/M2

## 2017-05-31 DIAGNOSIS — Z98.61 POST PTCA: Primary | ICD-10-CM

## 2017-05-31 PROCEDURE — 93798 PHYS/QHP OP CAR RHAB W/ECG: CPT

## 2017-06-02 ENCOUNTER — HOSPITAL ENCOUNTER (OUTPATIENT)
Dept: CARDIAC REHAB | Facility: HOSPITAL | Age: 67
Setting detail: THERAPIES SERIES
Discharge: HOME | End: 2017-06-02

## 2017-06-02 VITALS — SYSTOLIC BLOOD PRESSURE: 129 MMHG | DIASTOLIC BLOOD PRESSURE: 68 MMHG | HEART RATE: 57 BPM

## 2017-06-02 DIAGNOSIS — Z98.61 POST PTCA: Primary | ICD-10-CM

## 2017-06-02 PROCEDURE — 93798 PHYS/QHP OP CAR RHAB W/ECG: CPT

## 2017-06-02 NOTE — ADDENDUM NOTE
Encounter addended by: Anne-Marie Dejesus, RRT on: 6/2/2017  3:42 PM<BR>     Actions taken: Charge Capture section accepted

## 2017-06-02 NOTE — ADDENDUM NOTE
Encounter addended by: Yaneth Estrella RD on: 6/2/2017 12:45 PM<BR>     Actions taken: Visit Navigator Flowsheet section accepted

## 2017-06-05 ENCOUNTER — HOSPITAL ENCOUNTER (OUTPATIENT)
Dept: CARDIAC REHAB | Facility: HOSPITAL | Age: 67
Setting detail: THERAPIES SERIES
Discharge: HOME | End: 2017-06-05

## 2017-06-05 VITALS
SYSTOLIC BLOOD PRESSURE: 150 MMHG | WEIGHT: 221 LBS | BODY MASS INDEX: 35.67 KG/M2 | DIASTOLIC BLOOD PRESSURE: 77 MMHG | HEART RATE: 58 BPM

## 2017-06-05 DIAGNOSIS — Z98.61 POST PTCA: Primary | ICD-10-CM

## 2017-06-05 PROCEDURE — 93798 PHYS/QHP OP CAR RHAB W/ECG: CPT

## 2017-06-07 ENCOUNTER — HOSPITAL ENCOUNTER (OUTPATIENT)
Dept: CARDIAC REHAB | Facility: HOSPITAL | Age: 67
Setting detail: THERAPIES SERIES
Discharge: HOME | End: 2017-06-07

## 2017-06-07 VITALS — HEART RATE: 53 BPM | DIASTOLIC BLOOD PRESSURE: 79 MMHG | SYSTOLIC BLOOD PRESSURE: 132 MMHG

## 2017-06-07 DIAGNOSIS — Z98.61 POST PTCA: Primary | ICD-10-CM

## 2017-06-07 PROCEDURE — 93798 PHYS/QHP OP CAR RHAB W/ECG: CPT

## 2017-06-09 ENCOUNTER — HOSPITAL ENCOUNTER (OUTPATIENT)
Dept: CARDIAC REHAB | Facility: HOSPITAL | Age: 67
Setting detail: THERAPIES SERIES
Discharge: HOME | End: 2017-06-09

## 2017-06-09 VITALS — HEART RATE: 64 BPM | SYSTOLIC BLOOD PRESSURE: 128 MMHG | DIASTOLIC BLOOD PRESSURE: 69 MMHG

## 2017-06-09 DIAGNOSIS — Z98.61 POST PTCA: Primary | ICD-10-CM

## 2017-06-09 PROCEDURE — 93798 PHYS/QHP OP CAR RHAB W/ECG: CPT

## 2017-06-12 ENCOUNTER — HOSPITAL ENCOUNTER (OUTPATIENT)
Dept: CARDIAC REHAB | Facility: HOSPITAL | Age: 67
Setting detail: THERAPIES SERIES
Discharge: HOME | End: 2017-06-12

## 2017-06-12 VITALS
BODY MASS INDEX: 35.75 KG/M2 | WEIGHT: 221.5 LBS | HEART RATE: 62 BPM | DIASTOLIC BLOOD PRESSURE: 81 MMHG | SYSTOLIC BLOOD PRESSURE: 152 MMHG

## 2017-06-12 DIAGNOSIS — Z98.61 POST PTCA: Primary | ICD-10-CM

## 2017-06-12 PROCEDURE — 93798 PHYS/QHP OP CAR RHAB W/ECG: CPT

## 2017-06-12 RX ORDER — LISINOPRIL 5 MG/1
5 TABLET ORAL DAILY
Qty: 90 TABLET | Refills: 1 | Status: SHIPPED | OUTPATIENT
Start: 2017-06-12 | End: 2018-03-27 | Stop reason: SDUPTHER

## 2017-06-14 ENCOUNTER — HOSPITAL ENCOUNTER (OUTPATIENT)
Dept: CARDIAC REHAB | Facility: HOSPITAL | Age: 67
Setting detail: THERAPIES SERIES
Discharge: HOME | End: 2017-06-14

## 2017-06-14 VITALS — HEART RATE: 58 BPM | DIASTOLIC BLOOD PRESSURE: 67 MMHG | SYSTOLIC BLOOD PRESSURE: 141 MMHG

## 2017-06-14 DIAGNOSIS — Z98.61 POST PTCA: Primary | ICD-10-CM

## 2017-06-14 PROCEDURE — 93798 PHYS/QHP OP CAR RHAB W/ECG: CPT

## 2017-06-16 ENCOUNTER — HOSPITAL ENCOUNTER (OUTPATIENT)
Dept: CARDIAC REHAB | Facility: HOSPITAL | Age: 67
Setting detail: THERAPIES SERIES
Discharge: HOME | End: 2017-06-16

## 2017-06-16 VITALS — DIASTOLIC BLOOD PRESSURE: 66 MMHG | HEART RATE: 51 BPM | SYSTOLIC BLOOD PRESSURE: 135 MMHG

## 2017-06-16 DIAGNOSIS — Z98.61 POST PTCA: Primary | ICD-10-CM

## 2017-06-16 PROCEDURE — 93798 PHYS/QHP OP CAR RHAB W/ECG: CPT

## 2017-06-21 ENCOUNTER — HOSPITAL ENCOUNTER (OUTPATIENT)
Dept: CARDIAC REHAB | Facility: HOSPITAL | Age: 67
Setting detail: THERAPIES SERIES
Discharge: HOME | End: 2017-06-21

## 2017-06-21 VITALS — SYSTOLIC BLOOD PRESSURE: 131 MMHG | HEART RATE: 71 BPM | DIASTOLIC BLOOD PRESSURE: 70 MMHG

## 2017-06-21 DIAGNOSIS — Z98.61 POST PTCA: Primary | ICD-10-CM

## 2017-06-21 PROCEDURE — 93798 PHYS/QHP OP CAR RHAB W/ECG: CPT

## 2017-06-23 ENCOUNTER — HOSPITAL ENCOUNTER (OUTPATIENT)
Dept: CARDIAC REHAB | Facility: HOSPITAL | Age: 67
Setting detail: THERAPIES SERIES
Discharge: HOME | End: 2017-06-23

## 2017-06-23 VITALS — HEART RATE: 55 BPM | SYSTOLIC BLOOD PRESSURE: 151 MMHG | DIASTOLIC BLOOD PRESSURE: 74 MMHG

## 2017-06-23 DIAGNOSIS — Z98.61 POST PTCA: Primary | ICD-10-CM

## 2017-06-23 PROCEDURE — 93798 PHYS/QHP OP CAR RHAB W/ECG: CPT

## 2017-06-26 ENCOUNTER — APPOINTMENT (OUTPATIENT)
Dept: CARDIAC REHAB | Facility: HOSPITAL | Age: 67
End: 2017-06-26

## 2017-06-28 ENCOUNTER — APPOINTMENT (OUTPATIENT)
Dept: CARDIAC REHAB | Facility: HOSPITAL | Age: 67
End: 2017-06-28

## 2017-06-30 ENCOUNTER — APPOINTMENT (OUTPATIENT)
Dept: CARDIAC REHAB | Facility: HOSPITAL | Age: 67
End: 2017-06-30

## 2017-07-03 ENCOUNTER — APPOINTMENT (OUTPATIENT)
Dept: CARDIAC REHAB | Facility: HOSPITAL | Age: 67
End: 2017-07-03

## 2017-07-05 ENCOUNTER — APPOINTMENT (OUTPATIENT)
Dept: CARDIAC REHAB | Facility: HOSPITAL | Age: 67
End: 2017-07-05

## 2017-07-07 ENCOUNTER — APPOINTMENT (OUTPATIENT)
Dept: CARDIAC REHAB | Facility: HOSPITAL | Age: 67
End: 2017-07-07

## 2017-07-10 ENCOUNTER — APPOINTMENT (OUTPATIENT)
Dept: CARDIAC REHAB | Facility: HOSPITAL | Age: 67
End: 2017-07-10

## 2017-07-12 ENCOUNTER — APPOINTMENT (OUTPATIENT)
Dept: CARDIAC REHAB | Facility: HOSPITAL | Age: 67
End: 2017-07-12

## 2017-07-14 ENCOUNTER — APPOINTMENT (OUTPATIENT)
Dept: CARDIAC REHAB | Facility: HOSPITAL | Age: 67
End: 2017-07-14

## 2017-07-17 ENCOUNTER — APPOINTMENT (OUTPATIENT)
Dept: CARDIAC REHAB | Facility: HOSPITAL | Age: 67
End: 2017-07-17

## 2017-07-19 ENCOUNTER — APPOINTMENT (OUTPATIENT)
Dept: CARDIAC REHAB | Facility: HOSPITAL | Age: 67
End: 2017-07-19

## 2017-07-21 ENCOUNTER — APPOINTMENT (OUTPATIENT)
Dept: CARDIAC REHAB | Facility: HOSPITAL | Age: 67
End: 2017-07-21

## 2017-07-24 ENCOUNTER — APPOINTMENT (OUTPATIENT)
Dept: CARDIAC REHAB | Facility: HOSPITAL | Age: 67
End: 2017-07-24

## 2017-07-26 ENCOUNTER — APPOINTMENT (OUTPATIENT)
Dept: CARDIAC REHAB | Facility: HOSPITAL | Age: 67
End: 2017-07-26

## 2017-07-28 ENCOUNTER — APPOINTMENT (OUTPATIENT)
Dept: CARDIAC REHAB | Facility: HOSPITAL | Age: 67
End: 2017-07-28

## 2017-07-31 ENCOUNTER — APPOINTMENT (OUTPATIENT)
Dept: CARDIAC REHAB | Facility: HOSPITAL | Age: 67
End: 2017-07-31

## 2017-07-31 RX ORDER — PANTOPRAZOLE SODIUM 40 MG/1
TABLET, DELAYED RELEASE ORAL
Qty: 30 TABLET | Refills: 5 | Status: SHIPPED | OUTPATIENT
Start: 2017-07-31 | End: 2018-07-28 | Stop reason: SDUPTHER

## 2017-07-31 RX ORDER — RANOLAZINE 500 MG/1
TABLET, FILM COATED, EXTENDED RELEASE ORAL
Qty: 60 TABLET | Refills: 5 | Status: SHIPPED | OUTPATIENT
Start: 2017-07-31 | End: 2018-07-09 | Stop reason: SDUPTHER

## 2017-08-02 ENCOUNTER — APPOINTMENT (OUTPATIENT)
Dept: CARDIAC REHAB | Facility: HOSPITAL | Age: 67
End: 2017-08-02

## 2017-08-04 ENCOUNTER — APPOINTMENT (OUTPATIENT)
Dept: CARDIAC REHAB | Facility: HOSPITAL | Age: 67
End: 2017-08-04

## 2017-08-07 ENCOUNTER — APPOINTMENT (OUTPATIENT)
Dept: CARDIAC REHAB | Facility: HOSPITAL | Age: 67
End: 2017-08-07

## 2017-08-09 ENCOUNTER — APPOINTMENT (OUTPATIENT)
Dept: CARDIAC REHAB | Facility: HOSPITAL | Age: 67
End: 2017-08-09

## 2017-08-11 ENCOUNTER — APPOINTMENT (OUTPATIENT)
Dept: CARDIAC REHAB | Facility: HOSPITAL | Age: 67
End: 2017-08-11

## 2017-08-14 ENCOUNTER — APPOINTMENT (OUTPATIENT)
Dept: CARDIAC REHAB | Facility: HOSPITAL | Age: 67
End: 2017-08-14

## 2017-08-16 ENCOUNTER — APPOINTMENT (OUTPATIENT)
Dept: CARDIAC REHAB | Facility: HOSPITAL | Age: 67
End: 2017-08-16

## 2017-11-01 ENCOUNTER — OFFICE VISIT (OUTPATIENT)
Dept: CARDIOLOGY | Facility: CLINIC | Age: 67
End: 2017-11-01

## 2017-11-01 VITALS
BODY MASS INDEX: 35.36 KG/M2 | DIASTOLIC BLOOD PRESSURE: 68 MMHG | HEART RATE: 60 BPM | SYSTOLIC BLOOD PRESSURE: 122 MMHG | HEIGHT: 66 IN | WEIGHT: 220 LBS

## 2017-11-01 DIAGNOSIS — Z98.61 S/P PTCA (PERCUTANEOUS TRANSLUMINAL CORONARY ANGIOPLASTY): ICD-10-CM

## 2017-11-01 DIAGNOSIS — I25.10 CHRONIC CORONARY ARTERY DISEASE: Primary | ICD-10-CM

## 2017-11-01 PROCEDURE — 99213 OFFICE O/P EST LOW 20 MIN: CPT | Performed by: INTERNAL MEDICINE

## 2017-11-01 NOTE — PROGRESS NOTES
Justin Baer  67 y.o. male    11/01/2017  1. Chronic coronary artery disease    2. S/P PTCA (percutaneous transluminal coronary angioplasty)        History of Present Illness    Mr. Baer is here for follow-up of his above stated problems.  He denied any chest pain, shortness of breath, palpitation, dizziness or syncope.  His been compliant with his medications.  Blood pressure was in the normal range.  His lipid profiles were normal when checked in March this see year.        SUBJECTIVE    Allergies   Allergen Reactions   • Anaprox [Naproxen Sodium] Other (See Comments)     Caused pt to pass out.   • Ibuprofen Other (See Comments)     Caused pt to pass out.   • Iodinated Diagnostic Agents    • Iodine          Past Medical History:   Diagnosis Date   • Angina pectoris    • Coronary artery disease    • Dyspnea    • Essential hypertension, malignant    • Hyperlipidemia    • Kidney stone    • Myocardial infarction    • Palpitations    • Renal stones    • Ventricular fibrillation          Past Surgical History:   Procedure Laterality Date   • APPENDECTOMY     • CARDIAC CATHETERIZATION     • CARDIAC CATHETERIZATION N/A 3/29/2017    Procedure: Coronary angiography;  Surgeon: Bolivar Ochoa MD;  Location: [facility] CATH INVASIVE LOCATION;  Service:    • CARDIAC CATHETERIZATION  3/29/2017    Procedure: Functional Flow [place];  Surgeon: Bolivar Ochoa MD;  Location: [facility] CATH INVASIVE LOCATION;  Service:    • CORONARY ANGIOPLASTY     • CORONARY STENT PLACEMENT     • HERNIA REPAIR     • KIDNEY STONE SURGERY     • [place] RT/LT HEART CATHETERS N/A 3/29/2017    Procedure: Percutaneous Coronary Intervention;  Surgeon: Bolivar Ochoa MD;  Location: [facility] CATH INVASIVE LOCATION;  Service: Cardiovascular   • VASECTOMY           No family history on file.      Social History     Social History   • Marital status:      Spouse name: N/A   • Number of children: N/A   • Years of education: N/A     Occupational  "History   • Not on file.     Social History Main Topics   • Smoking status: Former Smoker     Packs/day: 1.50     Years: 40.00     Types: Cigarettes     Start date: 1970     Quit date: 2010   • Smokeless tobacco: Never Used   • Alcohol use No   • Drug use: No   • Sexual activity: Defer     Other Topics Concern   • Not on file     Social History Narrative         Current Outpatient Prescriptions   Medication Sig Dispense Refill   • aspirin 81 MG EC tablet Take 81 mg by mouth Daily.     • BRILINTA 90 MG tablet tablet TAKE ONE TABLET BY MOUTH TWICE A  tablet 2   • lisinopril (PRINIVIL,ZESTRIL) 5 MG tablet Take 1 tablet by mouth Daily. 90 tablet 1   • nitroglycerin (NITROSTAT) 0.4 MG SL tablet Place 1 tablet under the tongue Every 5 (Five) Minutes As Needed for Chest Pain for up to 3 doses. Take no more than 3 doses in 15 minutes. 20 tablet 11   • pantoprazole (PROTONIX) 40 MG EC tablet TAKE ONE TABLET BY MOUTH DAILY 30 tablet 5   • RANEXA 500 MG 12 hr tablet TAKE ONE TABLET BY MOUTH TWICE A DAY 60 tablet 5   • simvastatin (ZOCOR) 40 MG tablet TAKE ONE TABLET BY MOUTH EVERY EVENING 90 tablet 2     No current facility-administered medications for this visit.          OBJECTIVE    /68  Pulse 60  Ht 66\" (167.6 cm)  Wt 220 lb (99.8 kg)  BMI 35.51 kg/m2        Review of Systems     Constitutional:  Denies recent weight loss, weight gain, fever or chills, no change in exercise tolerance     HENT:  Denies any hearing loss, epistaxis, hoarseness, or difficulty speaking.     Eyes: Wears eyeglasses or contact lenses     Respiratory:  Denies dyspnea with exertion,no cough, wheezing, or hemoptysis.     Cardiovascular: Negative for palpations, chest pain, orthopnea, PND, peripheral edema, syncope, or claudication.     Gastrointestinal:  Denies change in bowel habits, dyspepsia, ulcer disease, hematochezia, or melena.     Endocrine: Negative for cold intolerance, heat intolerance, polydipsia, polyphagia and polyuria. "     Genitourinary: Negative.      Musculoskeletal: Denies any history of arthritic symptoms or back problems     Skin:  Denies any change in hair or nails, rashes, or skin lesions.     Allergic/Immunologic: Negative.  Negative for environmental allergies, food allergies and immunocompromised state.     Neurological:  Denies any history of recurrent headaches, strokes, TIA, or seizure disorder.     Hematological: Denies any food allergies, seasonal allergies, bleeding disorders, or lymphadenopathy.     Psychiatric/Behavioral: Denies any history of depression, substance abuse, or change in cognitive function.         Physical Exam     Constitutional: Cooperative, alert and oriented, well-developed, well-nourished, in no acute distress.     HENT:   Head: Normocephalic, normal hair patterns, no masses or tenderness.  Ears, Nose, and Throat: No gross abnormalities. No pallor or cyanosis.   Eyes: EOMS intact, PERRL, conjunctivae and lids unremarkable. Fundoscopic exam and visual fields not performed.   Neck: No palpable masses or adenopathy, no thyromegaly, no JVD, carotid pulses are full and equal bilaterally and without  Bruits.     Cardiovascular: Regular rhythm, S1 and S2 normal, no S3 or S4. Apical impulse not displaced. No murmurs, gallops, or rubs detected.     Pulmonary/Chest: Chest: normal symmetry, no tenderness to palpation, normal respiratory excursion,  no use of accessory muscles.            Pulmonary: Normal breath sounds. No rales or ronchi.    Abdominal: Abdomen soft, bowel sounds normoactive, no masses, no hepatosplenomegaly, non-tender, no bruits.     Musculoskeletal: No deformities, clubbing, cyanosis, erythema, or edema observed.     Neurological: No gross motor or sensory deficits noted, affect appropriate, oriented to time, person, place.     Skin: Warm and dry to the touch, no apparent skin lesions or masses noted.     Psychiatric: He has a normal mood and affect. His behavior is normal. Judgment  and thought content normal.         Procedures      Lab Results   Component Value Date    WBC 9.29 03/30/2017    HGB 12.6 (L) 03/30/2017    HCT 35.6 (L) 03/30/2017    MCV 91.0 03/30/2017     03/30/2017     Lab Results   Component Value Date    GLUCOSE 102 (H) 03/30/2017    BUN 22 (H) 03/30/2017    CREATININE 0.94 03/30/2017    EGFRIFNONA 80 03/30/2017    BCR 23.4 03/30/2017    CO2 23.0 03/30/2017    CALCIUM 8.4 03/30/2017    ALBUMIN 4.30 03/27/2017    LABIL2 1.5 03/27/2017    AST 27 03/27/2017    ALT 26 03/27/2017     Lab Results   Component Value Date    CHOL 125 03/30/2017     Lab Results   Component Value Date    TRIG 59 03/30/2017    TRIG 93 10/09/2015     Lab Results   Component Value Date    HDL 47 (L) 03/30/2017     Lab Results   Component Value Date    LDLCALC 65 10/09/2015     No results found for: LDL  No results found for: HDLLDLRATIO  No components found for: CHOLHDL  No results found for: HGBA1C  Lab Results   Component Value Date    TSH 2.900 03/27/2017           ASSESSMENT AND PLAN    Mr. Baer is stable with no evidence of progression of coronary artery disease.  Antiplatelet therapy with aspirin and brilinta, antihypertensive therapy with lisinopril and antianginal therapy with Ranexa and lipid-lowering therapy with simvastatin has been continued.  Justin was seen today for follow-up.    Diagnoses and all orders for this visit:    Chronic coronary artery disease    S/P PTCA (percutaneous transluminal coronary angioplasty)        Javi Landry MD  11/1/2017  9:46 AM

## 2018-01-26 RX ORDER — SIMVASTATIN 40 MG
TABLET ORAL
Qty: 90 TABLET | Refills: 2 | Status: SHIPPED | OUTPATIENT
Start: 2018-01-26 | End: 2018-06-19 | Stop reason: SDUPTHER

## 2018-03-27 RX ORDER — LISINOPRIL 5 MG/1
5 TABLET ORAL DAILY
Qty: 90 TABLET | Refills: 3 | Status: SHIPPED | OUTPATIENT
Start: 2018-03-27 | End: 2023-03-15 | Stop reason: SDUPTHER

## 2018-04-18 RX ORDER — TICAGRELOR 90 MG/1
TABLET ORAL
Qty: 180 TABLET | Refills: 1 | Status: CANCELLED | OUTPATIENT
Start: 2018-04-18

## 2018-06-19 ENCOUNTER — OFFICE VISIT (OUTPATIENT)
Dept: CARDIOLOGY | Facility: CLINIC | Age: 68
End: 2018-06-19

## 2018-06-19 VITALS
BODY MASS INDEX: 35.36 KG/M2 | HEART RATE: 53 BPM | DIASTOLIC BLOOD PRESSURE: 70 MMHG | WEIGHT: 220 LBS | SYSTOLIC BLOOD PRESSURE: 130 MMHG | HEIGHT: 66 IN

## 2018-06-19 DIAGNOSIS — R00.1 SINUS BRADYCARDIA: ICD-10-CM

## 2018-06-19 DIAGNOSIS — I25.10 CHRONIC CORONARY ARTERY DISEASE: Primary | ICD-10-CM

## 2018-06-19 DIAGNOSIS — Z98.61 S/P PTCA (PERCUTANEOUS TRANSLUMINAL CORONARY ANGIOPLASTY): ICD-10-CM

## 2018-06-19 PROCEDURE — 99214 OFFICE O/P EST MOD 30 MIN: CPT | Performed by: INTERNAL MEDICINE

## 2018-06-19 PROCEDURE — 93000 ELECTROCARDIOGRAM COMPLETE: CPT | Performed by: INTERNAL MEDICINE

## 2018-06-19 RX ORDER — SIMVASTATIN 40 MG
20 TABLET ORAL EVERY EVENING
Qty: 90 TABLET | Refills: 2 | Status: SHIPPED | OUTPATIENT
Start: 2018-06-19 | End: 2019-07-26 | Stop reason: SDUPTHER

## 2018-06-19 NOTE — PROGRESS NOTES
Justin Baer  67 y.o. male    06/19/2018  1. Chronic coronary artery disease    2. S/P PTCA (percutaneous transluminal coronary angioplasty)    3. Sinus bradycardia        History of Present Illness   is here for follow-up of his above stated problems.  He denied any chest pain, shortness of breath, palpitation, dizziness or syncope.  He has been compliant with his medication and [facility]  He had lab work done in the spring and a copy of this will be open for our records.  Echocardiogram in May last year showed:   · Left ventricular wall thickness is consistent with borderline concentric hypertrophy.  · Left ventricular function is normal. Estimated EF = 55%.  · Left ventricular diastolic dysfunction (grade I) consistent with impaired relaxation.  · Left atrial cavity size is borderline dilated.  · Mild aortic valve regurgitation is present.  · Mild tricuspid valve regurgitation is present.  He underwent PCI to the left circumflex coronary artery in March 2017.  EKG today showed sinus rhythm with heart rate of 53 bpm with no ST-T wave changes diagnostic of ischemia.    SUBJECTIVE    Allergies   Allergen Reactions   • Anaprox [Naproxen Sodium] Other (See Comments)     Caused pt to pass out.   • Ibuprofen Other (See Comments)     Caused pt to pass out.   • Iodinated Diagnostic Agents    • Iodine          Past Medical History:   Diagnosis Date   • Angina pectoris    • Coronary artery disease    • Dyspnea    • Essential hypertension, malignant    • Hyperlipidemia    • Kidney stone    • Myocardial infarction    • Palpitations    • Renal stones    • Ventricular fibrillation          Past Surgical History:   Procedure Laterality Date   • APPENDECTOMY     • CARDIAC CATHETERIZATION     • CARDIAC CATHETERIZATION N/A 3/29/2017    Procedure: Coronary angiography;  Surgeon: Bolivar Ochoa MD;  Location: [facility] INVASIVE LOCATION;  Service:    • CARDIAC  CATHETERIZATION  3/29/2017    Procedure: Functional Flow [place];  Surgeon: Bolivar Ochoa MD;  Location: [facility] CATH INVASIVE LOCATION;  Service:    • CORONARY ANGIOPLASTY     • CORONARY STENT PLACEMENT     • HERNIA REPAIR     • KIDNEY STONE SURGERY     • [place] RT/LT HEART CATHETERS N/A 3/29/2017    Procedure: Percutaneous Coronary Intervention;  Surgeon: Bolivar Ochoa MD;  Location: [facility] INVASIVE LOCATION;  Service: Cardiovascular   • VASECTOMY           History reviewed. No pertinent family history.      Social History     Social History   • Marital status:      Spouse name: N/A   • Number of children: N/A   • Years of education: N/A     Occupational History   • Not on file.     Social History Main Topics   • Smoking status: Former Smoker     Packs/day: 1.50     Years: 40.00     Types: Cigarettes     Start date: 1970     Quit date: 2010   • Smokeless tobacco: Never Used   • Alcohol use No   • Drug use: No   • Sexual activity: Defer     Other Topics Concern   • Not on file     Social History Narrative   • No narrative on file         Current Outpatient Prescriptions   Medication Sig Dispense Refill   • aspirin 81 MG EC tablet Take 81 mg by mouth Daily.     • lisinopril (PRINIVIL,ZESTRIL) 5 MG tablet Take 1 tablet by mouth Daily. 90 tablet 3   • nitroglycerin (NITROSTAT) 0.4 MG SL tablet Place 1 tablet under the tongue Every 5 (Five) Minutes As Needed for Chest Pain for up to 3 doses. Take no more than 3 doses in 15 minutes. 20 tablet 11   • pantoprazole (PROTONIX) 40 MG EC tablet TAKE ONE TABLET BY MOUTH DAILY 30 tablet 5   • RANEXA 500 MG 12 hr tablet TAKE ONE TABLET BY MOUTH TWICE A DAY 60 tablet 5   • simvastatin (ZOCOR) 40 MG tablet Take 0.5 tablets by mouth Every Evening. 90 tablet 2   • ticagrelor (BRILINTA) 90 MG tablet tablet Take 1 tablet by mouth 2 (Two) Times a Day. 180 tablet 4     No current facility-administered medications for this visit.          OBJECTIVE    /70    "Pulse 53   Ht 167.6 cm (65.98\")   Wt 99.8 kg (220 lb)   BMI 35.53 kg/m²         Review of Systems     Constitutional:  Denies recent weight loss, weight gain, fever or chills, no change in exercise tolerance     HENT:  h/o hearing loss, epistaxis, hoarseness, or difficulty speaking.     Eyes: Wears eyeglasses or contact lenses     Respiratory:  Denies dyspnea with exertion,no cough, wheezing, or hemoptysis.     Cardiovascular: Negative for palpations, chest pain, orthopnea, PND, peripheral edema, syncope, or claudication.     Gastrointestinal:  Denies change in bowel habits, dyspepsia, ulcer disease, hematochezia, or melena.     Endocrine: Negative for cold intolerance, heat intolerance, polydipsia, polyphagia and polyuria.    Genitourinary: Negative.      Musculoskeletal: Denies any history of arthritic symptoms or back problems     Skin:  Denies any change in hair or nails, rashes, or skin lesions.     Allergic/Immunologic: Negative.  Negative for environmental allergies, food allergies and immunocompromised state.     Neurological:  Denies any history of recurrent headaches, strokes, TIA, or seizure disorder.     Hematological: Denies any food allergies, seasonal allergies, bleeding disorders, or lymphadenopathy.     Psychiatric/Behavioral: Denies any history of depression, substance abuse, or change in cognitive function.         Physical Exam     Constitutional: Cooperative, alert and oriented, in no acute distress.     HENT:   Head: Normocephalic, normal hair patterns, no masses or tenderness.  Ears, Nose, and Throat: No gross abnormalities. No pallor or cyanosis.   Eyes: EOMS intact, PERRL, conjunctivae and lids unremarkable. Fundoscopic exam and visual fields not performed.   Neck: No palpable masses or adenopathy, no thyromegaly, no JVD, carotid pulses are full and equal bilaterally and without  Bruits.     Cardiovascular: Regular rhythm, S1 and S2 normal, no S3 or S4.  No murmurs, gallops, or rubs " detected.     Pulmonary/Chest: Chest: normal symmetry,  normal respiratory excursion, no intercostal retraction, no use of accessory muscles.            Pulmonary: Normal breath sounds. No rales or ronchi.    Abdominal: Abdomen soft, bowel sounds normoactive, no masses, no hepatosplenomegaly, non-tender, no bruits.     Musculoskeletal: No deformities, clubbing, cyanosis, erythema, or edema observed.     Neurological: No gross motor or sensory deficits noted, affect appropriate, oriented to time, person, place.     Skin: Warm and dry to the touch, no apparent skin lesions or masses noted.     Psychiatric: He has a normal mood and affect. His behavior is normal. Judgment and thought content normal.           ECG 12 Lead  Date/Time: 6/19/2018 8:35 AM  Performed by: JAGDISH ESPINOSA  Authorized by: JAGDISH ESPINOSA   Comparison: not compared with previous ECG   Rhythm: sinus rhythm and sinus bradycardia  Rate: normal  QRS axis: normal  Comments: Heart rate of 53 bpm.  No ST-T wave changes diagnostic of ischemia.              Lab Results   Component Value Date    WBC 9.29 03/30/2017    HGB 12.6 (L) 03/30/2017    HCT 35.6 (L) 03/30/2017    MCV 91.0 03/30/2017     03/30/2017     Lab Results   Component Value Date    GLUCOSE 102 (H) 03/30/2017    BUN 22 (H) 03/30/2017    CREATININE 0.94 03/30/2017    EGFRIFNONA 80 03/30/2017    BCR 23.4 03/30/2017    CO2 23.0 03/30/2017    CALCIUM 8.4 03/30/2017    ALBUMIN 4.30 03/27/2017    LABIL2 1.5 03/27/2017    AST 27 03/27/2017    ALT 26 03/27/2017     Lab Results   Component Value Date    CHOL 125 03/30/2017     Lab Results   Component Value Date    TRIG 59 03/30/2017    TRIG 93 10/09/2015     Lab Results   Component Value Date    HDL 47 (L) 03/30/2017     No components found for: LDLCALC  Lab Results   Component Value Date    LDL 56 03/30/2017    LDL 65 10/09/2015     No results found for: HDLLDLRATIO  No components found for: CHOLHDL  No results found for:  HGBA1C  Lab Results   Component Value Date    TSH 2.900 03/27/2017           ASSESSMENT AND PLAN   is stable with no evidence of progression of coronary artery disease.  Blood pressures in the normal range.  His present medicines including antiplatelet therapy with aspirin and brilinta, antianginal therapy with Ranexa, lipid-lowering therapy with simvastatin and antihypertensive therapy with lisinopril have been continued.  I've decreased the dose of simvastatin 20 mg daily.  [facility] and [facility] will [facility] obtained [facility].    Justin was seen today for follow-up.    Diagnoses and all orders for this visit:    Chronic coronary artery disease    S/P PTCA (percutaneous transluminal coronary angioplasty)    Sinus bradycardia    Other orders  -     simvastatin (ZOCOR) 40 MG tablet; Take 0.5 tablets by mouth Every Evening.        Javi Landry MD  6/19/2018  8:31 AM

## 2018-07-09 RX ORDER — RANOLAZINE 500 MG/1
TABLET, FILM COATED, EXTENDED RELEASE ORAL
Qty: 60 TABLET | Refills: 4 | Status: SHIPPED | OUTPATIENT
Start: 2018-07-09 | End: 2019-07-14 | Stop reason: SDUPTHER

## 2018-07-30 RX ORDER — PANTOPRAZOLE SODIUM 40 MG/1
TABLET, DELAYED RELEASE ORAL
Qty: 30 TABLET | Refills: 4 | Status: SHIPPED | OUTPATIENT
Start: 2018-07-30 | End: 2019-06-14 | Stop reason: SDUPTHER

## 2019-01-09 ENCOUNTER — OFFICE VISIT (OUTPATIENT)
Dept: CARDIOLOGY | Facility: CLINIC | Age: 69
End: 2019-01-09

## 2019-01-09 VITALS
BODY MASS INDEX: 35.36 KG/M2 | HEART RATE: 62 BPM | DIASTOLIC BLOOD PRESSURE: 80 MMHG | HEIGHT: 66 IN | SYSTOLIC BLOOD PRESSURE: 132 MMHG | OXYGEN SATURATION: 96 % | WEIGHT: 220 LBS

## 2019-01-09 DIAGNOSIS — Z98.61 S/P PTCA (PERCUTANEOUS TRANSLUMINAL CORONARY ANGIOPLASTY): ICD-10-CM

## 2019-01-09 DIAGNOSIS — I25.10 CHRONIC CORONARY ARTERY DISEASE: Primary | ICD-10-CM

## 2019-01-09 DIAGNOSIS — Z86.74 H/O CARDIAC ARREST: ICD-10-CM

## 2019-01-09 DIAGNOSIS — R00.1 SINUS BRADYCARDIA: ICD-10-CM

## 2019-01-09 PROCEDURE — 99214 OFFICE O/P EST MOD 30 MIN: CPT | Performed by: INTERNAL MEDICINE

## 2019-01-09 NOTE — PROGRESS NOTES
Justin Baer  68 y.o. male    01/09/2019  1. Chronic coronary artery disease    2. Sinus bradycardia    3. S/P PTCA (percutaneous transluminal coronary angioplasty)    4. H/O cardiac arrest        History of Present Illness    Mr. Baer is here for follow-up of his above stated problems.  He denied any chest pain, shortness of breath, palpitation and has been compliant with his medications.  I understand that [facility] unfortunately I do not have the results.  He will get a copy of the results for us.  Clinical exam today was unremarkable.  Blood pressure was in the normal range.        SUBJECTIVE    Allergies   Allergen Reactions   • Anaprox [Naproxen Sodium] Other (See Comments)     Caused pt to pass out.   • Ibuprofen Other (See Comments)     Caused pt to pass out.   • Iodinated Diagnostic Agents    • Iodine          Past Medical History:   Diagnosis Date   • Angina pectoris (CMS/HCC)    • Coronary artery disease    • Dyspnea    • Essential hypertension, malignant    • Hyperlipidemia    • Kidney stone    • Myocardial infarction    • Palpitations    • Renal stones    • Ventricular fibrillation (CMS/HCC)          Past Surgical History:   Procedure Laterality Date   • APPENDECTOMY     • CARDIAC CATHETERIZATION     • CARDIAC CATHETERIZATION N/A 3/29/2017    Procedure: Coronary angiography;  Surgeon: Bolivar Ochoa MD;  Location: [facility] CATH INVASIVE LOCATION;  Service:    • CARDIAC CATHETERIZATION  3/29/2017    Procedure: Functional Flow [place];  Surgeon: Bolivar Ochoa MD;  Location: [facility] CATH INVASIVE LOCATION;  Service:    • CORONARY ANGIOPLASTY     • CORONARY STENT PLACEMENT     • HERNIA REPAIR     • KIDNEY STONE SURGERY     • [place] RT/LT HEART CATHETERS N/A 3/29/2017    Procedure: Percutaneous Coronary Intervention;  Surgeon: Bolivar Ochoa MD;  Location: [facility] CATH INVASIVE LOCATION;  Service: Cardiovascular   • VASECTOMY           No family history on  "file.      Social History     Socioeconomic History   • Marital status:      Spouse name: Not on file   • Number of children: Not on file   • Years of education: Not on file   • Highest education level: Not on file   Social Needs   • Financial resource strain: Not on file   • Food insecurity - worry: Not on file   • Food insecurity - inability: Not on file   • Transportation needs - medical: Not on file   • Transportation needs - non-medical: Not on file   Occupational History   • Not on file   Tobacco Use   • Smoking status: Former Smoker     Packs/day: 1.50     Years: 40.00     Pack years: 60.00     Types: Cigarettes     Start date:      Last attempt to quit:      Years since quittin.0   • Smokeless tobacco: Never Used   Substance and Sexual Activity   • Alcohol use: No   • Drug use: No   • Sexual activity: Defer   Other Topics Concern   • Not on file   Social History Narrative   • Not on file         Current Outpatient Medications   Medication Sig Dispense Refill   • aspirin 81 MG EC tablet Take 81 mg by mouth Daily.     • lisinopril (PRINIVIL,ZESTRIL) 5 MG tablet Take 1 tablet by mouth Daily. 90 tablet 3   • nitroglycerin (NITROSTAT) 0.4 MG SL tablet Place 1 tablet under the tongue Every 5 (Five) Minutes As Needed for Chest Pain for up to 3 doses. Take no more than 3 doses in 15 minutes. 20 tablet 11   • pantoprazole (PROTONIX) 40 MG EC tablet TAKE ONE TABLET BY MOUTH DAILY 30 tablet 4   • RANEXA 500 MG 12 hr tablet TAKE ONE TABLET BY MOUTH TWICE A DAY 60 tablet 4   • simvastatin (ZOCOR) 40 MG tablet Take 0.5 tablets by mouth Every Evening. 90 tablet 2   • ticagrelor (BRILINTA) 90 MG tablet tablet Take 1 tablet by mouth 2 (Two) Times a Day. 180 tablet 4     No current facility-administered medications for this visit.          OBJECTIVE    /80   Pulse 62   Ht 167.6 cm (65.98\")   Wt 99.8 kg (220 lb)   SpO2 96%   BMI 35.53 kg/m²         Review of Systems     Constitutional:  Denies " recent weight loss, weight gain, fever or chills, no change in exercise tolerance     HENT:  Denies any hearing loss, epistaxis, hoarseness, or difficulty speaking.     Eyes: Wears eyeglasses or contact lenses     Respiratory:  Denies dyspnea with exertion,no cough, wheezing, or hemoptysis.     Cardiovascular: Negative for palpations, chest pain, orthopnea, PND, peripheral edema, syncope, or claudication.     Gastrointestinal:  Denies change in bowel habits, dyspepsia, ulcer disease, hematochezia, or melena.     Endocrine: Negative for cold intolerance, heat intolerance, polydipsia, polyphagia and polyuria.     Genitourinary: Negative.      Musculoskeletal: DJD.  History of a fall recently and injured his left leg.    Skin:  Denies any change in hair or nails, rashes, or skin lesions.     Allergic/Immunologic: Negative.  Negative for environmental allergies, food allergies and immunocompromised state.     Neurological:  Denies any history of recurrent headaches, strokes, TIA, or seizure disorder.     Hematological: Denies any food allergies, seasonal allergies, bleeding disorders, or lymphadenopathy.     Psychiatric/Behavioral: Denies any history of depression, substance abuse, or change in cognitive function.         Physical Exam     Constitutional: Cooperative, alert and oriented,  in no acute distress.     HENT:   Head: Normocephalic, normal hair patterns, no masses or tenderness.  Ears, Nose, and Throat: No gross abnormalities. No pallor or cyanosis.   Eyes: EOMS intact, PERRL, conjunctivae and lids unremarkable. Fundoscopic exam and visual fields not performed.   Neck: No palpable masses or adenopathy, no thyromegaly, no JVD, carotid pulses are full and equal bilaterally and without  Bruits.     Cardiovascular: Regular rhythm, S1 and S2 normal, no S3 or S4.  No murmurs, gallops, or rubs detected.     Pulmonary/Chest: Chest: normal symmetry,  normal respiratory excursion, no intercostal retraction, no use of  accessory muscles.            Pulmonary: Normal breath sounds. No rales or ronchi.    Abdominal: Abdomen soft, bowel sounds normoactive, no masses, no hepatosplenomegaly, non-tender, no bruits.     Musculoskeletal: No deformities, clubbing, cyanosis, erythema, or edema observed.     Neurological: No gross motor or sensory deficits noted, affect appropriate, oriented to time, person, place.     Skin: Warm and dry to the touch, no apparent skin lesions or masses noted.     Psychiatric: He has a normal mood and affect. His behavior is normal. Judgment and thought content normal.         Procedures      Lab Results   Component Value Date    WBC 9.29 03/30/2017    HGB 12.6 (L) 03/30/2017    HCT 35.6 (L) 03/30/2017    MCV 91.0 03/30/2017     03/30/2017     Lab Results   Component Value Date    GLUCOSE 102 (H) 03/30/2017    BUN 22 (H) 03/30/2017    CREATININE 0.94 03/30/2017    EGFRIFNONA 80 03/30/2017    BCR 23.4 03/30/2017    CO2 23.0 03/30/2017    CALCIUM 8.4 03/30/2017    ALBUMIN 4.30 03/27/2017    AST 27 03/27/2017    ALT 26 03/27/2017     Lab Results   Component Value Date    CHOL 125 03/30/2017     Lab Results   Component Value Date    TRIG 59 03/30/2017    TRIG 93 10/09/2015     Lab Results   Component Value Date    HDL 47 (L) 03/30/2017     No components found for: LDLCALC  Lab Results   Component Value Date    LDL 56 03/30/2017    LDL 65 10/09/2015     No results found for: HDLLDLRATIO  No components found for: CHOLHDL  No results found for: HGBA1C  Lab Results   Component Value Date    TSH 2.900 03/27/2017           ASSESSMENT AND PLAN  Mr. Baer is stable with no clinical evidence of angina, arrhythmia or congestive heart failure.  I've continued antiplatelet therapy with aspirin and Brilinta, antihypertensive therapy with lisinopril, antianginal therapy with Ranexa.  Patient's wife had several questions with regards to his medications, neurological status, cardiac status and these were explained in  sonal Anderson was seen today for follow-up.    Diagnoses and all orders for this visit:    Chronic coronary artery disease    Sinus bradycardia    S/P PTCA (percutaneous transluminal coronary angioplasty)    H/O cardiac arrest        Patient's Body mass index is 35.53 kg/m². BMI is above normal parameters. Recommendations include: exercise counseling and nutrition counseling.        Javi Landry MD  1/9/2019  11:44 AM

## 2019-06-06 RX ORDER — TICAGRELOR 90 MG/1
TABLET ORAL
Qty: 180 TABLET | Refills: 3 | Status: SHIPPED | OUTPATIENT
Start: 2019-06-06 | End: 2022-05-27

## 2019-06-14 RX ORDER — PANTOPRAZOLE SODIUM 40 MG/1
TABLET, DELAYED RELEASE ORAL
Qty: 60 TABLET | Refills: 3 | Status: SHIPPED | OUTPATIENT
Start: 2019-06-14 | End: 2020-02-17

## 2019-07-15 RX ORDER — RANOLAZINE 500 MG/1
TABLET, EXTENDED RELEASE ORAL
Qty: 180 TABLET | Refills: 3 | Status: SHIPPED | OUTPATIENT
Start: 2019-07-15 | End: 2020-09-10

## 2019-07-17 ENCOUNTER — OFFICE VISIT (OUTPATIENT)
Dept: CARDIOLOGY | Facility: CLINIC | Age: 69
End: 2019-07-17

## 2019-07-17 VITALS
SYSTOLIC BLOOD PRESSURE: 122 MMHG | DIASTOLIC BLOOD PRESSURE: 80 MMHG | OXYGEN SATURATION: 94 % | BODY MASS INDEX: 35.52 KG/M2 | HEART RATE: 54 BPM | WEIGHT: 221 LBS | HEIGHT: 66 IN

## 2019-07-17 DIAGNOSIS — Z86.74 H/O CARDIAC ARREST: ICD-10-CM

## 2019-07-17 DIAGNOSIS — I25.10 CHRONIC CORONARY ARTERY DISEASE: Primary | ICD-10-CM

## 2019-07-17 DIAGNOSIS — R00.1 SINUS BRADYCARDIA: ICD-10-CM

## 2019-07-17 PROCEDURE — 99214 OFFICE O/P EST MOD 30 MIN: CPT | Performed by: INTERNAL MEDICINE

## 2019-07-17 NOTE — PROGRESS NOTES
Justin Baer  68 y.o. male    07/17/2019  1. Chronic coronary artery disease    2. H/O cardiac arrest    3. Sinus bradycardia        History of Present Illness  Mr. Baer is here for follow-up of his above-stated problems. He has done reasonably well from a cardiac standpoint with no recurrence of chest pain suggestive of angina.  He has occasional twinges in the chest lasting just for a few seconds from time to time.  He has had history of PCI to left anterior descending coronary artery in 2010 and an acute MI/cardiac arrest setting, PCI to left circumflex coronary artery and obtuse marginal coronary artery in 2012 and PCI to ostial left circumflex coronary artery in 2013.    Clinical exam was unremarkable and his blood pressure was in the normal range.  His lipid profiles in the normal range.  He denied any palpitation, dizziness or syncope.  He is able to perform his activities of daily living without any restrictions.    SUBJECTIVE    Allergies   Allergen Reactions   • Anaprox [Naproxen Sodium] Other (See Comments)     Caused pt to pass out.   • Ibuprofen Other (See Comments)     Caused pt to pass out.   • Iodinated Diagnostic Agents    • Iodine          Past Medical History:   Diagnosis Date   • Angina pectoris (CMS/HCC)    • Coronary artery disease    • Dyspnea    • Essential hypertension, malignant    • Hyperlipidemia    • Kidney stone    • Myocardial infarction (CMS/HCC)    • Palpitations    • Renal stones    • Ventricular fibrillation (CMS/HCC)          Past Surgical History:   Procedure Laterality Date   • APPENDECTOMY     • CARDIAC CATHETERIZATION     • CARDIAC CATHETERIZATION N/A 3/29/2017    Procedure: Coronary angiography;  Surgeon: Bolivar Ochoa MD;  Location: [facility] CATH INVASIVE LOCATION;  Service:    • CARDIAC CATHETERIZATION  3/29/2017    Procedure: Functional Flow [place];  Surgeon: Bolivar Ochoa MD;  Location: [facility] CATH INVASIVE LOCATION;  Service:    • CORONARY ANGIOPLASTY   "   • CORONARY STENT PLACEMENT     • HERNIA REPAIR     • KIDNEY STONE SURGERY     • NV RT/LT HEART CATHETERS N/A 3/29/2017    Procedure: Percutaneous Coronary Intervention;  Surgeon: Bolivar Ochoa MD;  Location: [facility] INVASIVE LOCATION;  Service: Cardiovascular   • VASECTOMY           History reviewed. No pertinent family history.      Social History     Socioeconomic History   • Marital status:      Spouse name: Not on file   • Number of children: Not on file   • Years of education: Not on file   • Highest education level: Not on file   Tobacco Use   • Smoking status: Former Smoker     Packs/day: 1.50     Years: 40.00     Pack years: 60.00     Types: Cigarettes     Start date:      Last attempt to quit:      Years since quittin.5   • Smokeless tobacco: Never Used   Substance and Sexual Activity   • Alcohol use: No   • Drug use: No   • Sexual activity: Defer         Current Outpatient Medications   Medication Sig Dispense Refill   • aspirin 81 MG EC tablet Take 81 mg by mouth Daily.     • BRILINTA 90 MG tablet tablet TAKE ONE TABLET BY MOUTH TWICE A  tablet 3   • lisinopril (PRINIVIL,ZESTRIL) 5 MG tablet Take 1 tablet by mouth Daily. 90 tablet 3   • nitroglycerin (NITROSTAT) 0.4 MG SL tablet Place 1 tablet under the tongue Every 5 (Five) Minutes As Needed for Chest Pain for up to 3 doses. Take no more than 3 doses in 15 minutes. 20 tablet 11   • pantoprazole (PROTONIX) 40 MG EC tablet TAKE ONE TABLET BY MOUTH DAILY 60 tablet 3   • ranolazine (RANEXA) 500 MG 12 hr tablet TAKE ONE TABLET BY MOUTH TWICE A  tablet 3   • simvastatin (ZOCOR) 40 MG tablet Take 0.5 tablets by mouth Every Evening. 90 tablet 2     No current facility-administered medications for this visit.          OBJECTIVE    /80   Pulse 54   Ht 167.6 cm (65.98\")   Wt 100 kg (221 lb)   SpO2 94%   BMI 35.69 kg/m²         Review of Systems     Constitutional:  Denies recent weight loss, weight gain, " fever or chills, no change in exercise tolerance     HENT:  Denies any hearing loss, epistaxis, hoarseness, or difficulty speaking.     Eyes: Wears eyeglasses or contact lenses     Respiratory:  Denies dyspnea with exertion,no cough, wheezing, or hemoptysis.     Cardiovascular: Negative for palpations, chest pain, orthopnea, PND    Gastrointestinal:  Denies change in bowel habits, dyspepsia, ulcer disease, hematochezia, or melena.     Endocrine: Negative for cold intolerance, heat intolerance, polydipsia, polyphagia and polyuria.     Genitourinary: Negative.      Musculoskeletal: Denies any history of arthritic symptoms or back problems     Skin:  Denies any change in hair or nails, rashes, or skin lesions.     Allergic/Immunologic: Negative.  Negative for environmental allergies, food allergies and immunocompromised state.     Neurological:  Denies any history of recurrent headaches, strokes, TIA, or seizure disorder.     Hematological: Denies any food allergies, seasonal allergies, bleeding disorders, or lymphadenopathy.     Psychiatric/Behavioral: Denies any history of depression, substance abuse, or change in cognitive function.         Physical Exam     Constitutional: Cooperative, alert and oriented,  in no acute distress.     HENT:   Head: Normocephalic, normal hair patterns, no masses or tenderness.  Ears, Nose, and Throat: No gross abnormalities. No pallor or cyanosis.   Eyes: EOMS intact, PERRL, conjunctivae and lids unremarkable. Fundoscopic exam and visual fields not performed.   Neck: No palpable masses or adenopathy, no thyromegaly, no JVD, carotid pulses are full and equal bilaterally and without  Bruits.     Cardiovascular: Regular rhythm, S1 and S2 normal, no S3 or S4.  No murmurs, gallops, or rubs detected.     Pulmonary/Chest: Chest: normal symmetry, normal respiratory excursion, no intercostal retraction, no use of accessory muscles.            Pulmonary: Normal breath sounds. No rales or  kole.    Abdominal: Abdomen soft, bowel sounds normoactive, no masses, no hepatosplenomegaly, non-tender, no bruits.     Musculoskeletal: No deformities, clubbing, cyanosis, erythema, or edema observed.     Neurological: No gross motor or sensory deficits noted, affect appropriate, oriented to time, person, place.     Skin: Warm and dry to the touch, no apparent skin lesions or masses noted.     Psychiatric: He has a normal mood and affect. His behavior is normal. Judgment and thought content normal.         Procedures      Lab Results   Component Value Date    WBC 9.29 03/30/2017    HGB 12.6 (L) 03/30/2017    HCT 35.6 (L) 03/30/2017    MCV 91.0 03/30/2017     03/30/2017     Lab Results   Component Value Date    GLUCOSE 102 (H) 03/30/2017    BUN 22 (H) 03/30/2017    CREATININE 0.94 03/30/2017    EGFRIFNONA 80 03/30/2017    BCR 23.4 03/30/2017    CO2 23.0 03/30/2017    CALCIUM 8.4 03/30/2017    ALBUMIN 4.30 03/27/2017    AST 27 03/27/2017    ALT 26 03/27/2017     Lab Results   Component Value Date    CHOL 125 03/30/2017     Lab Results   Component Value Date    TRIG 59 03/30/2017    TRIG 93 10/09/2015     Lab Results   Component Value Date    HDL 47 (L) 03/30/2017     No components found for: LDLCALC  Lab Results   Component Value Date    LDL 56 03/30/2017    LDL 65 10/09/2015     No results found for: HDLLDLRATIO  No components found for: CHOLHDL  No results found for: HGBA1C  Lab Results   Component Value Date    TSH 2.900 03/27/2017           ASSESSMENT AND PLAN  Mr. Baer is stable with no evidence of angina, arrhythmia or congestive heart failure.  Antiplatelet therapy with aspirin and Plavix, antianginal therapy with Ranexa, lipid-lowering therapy with simvastatin and antihypertensive therapy with lisinopril have been continued.    Justin was seen today for follow-up.    Diagnoses and all orders for this visit:    Chronic coronary artery disease    H/O cardiac arrest    Sinus  bradycardia        Patient's Body mass index is 35.69 kg/m². BMI is above normal parameters. Recommendations include: exercise counseling and nutrition counseling.  Patient is a non-smoker.      Javi Landry MD  7/17/2019  9:27 AM

## 2019-07-26 RX ORDER — SIMVASTATIN 40 MG
TABLET ORAL
Qty: 45 TABLET | Refills: 1 | Status: SHIPPED | OUTPATIENT
Start: 2019-07-26 | End: 2020-02-17

## 2020-02-10 DIAGNOSIS — I25.10 CHRONIC CORONARY ARTERY DISEASE: Primary | ICD-10-CM

## 2020-02-17 RX ORDER — PANTOPRAZOLE SODIUM 40 MG/1
TABLET, DELAYED RELEASE ORAL
Qty: 90 TABLET | Refills: 1 | Status: SHIPPED | OUTPATIENT
Start: 2020-02-17 | End: 2020-09-09 | Stop reason: SDUPTHER

## 2020-02-17 RX ORDER — SIMVASTATIN 40 MG
TABLET ORAL
Qty: 45 TABLET | Refills: 1 | Status: SHIPPED | OUTPATIENT
Start: 2020-02-17 | End: 2020-09-09 | Stop reason: SDUPTHER

## 2020-06-22 DIAGNOSIS — I25.10 CHRONIC CORONARY ARTERY DISEASE: Primary | ICD-10-CM

## 2020-09-03 RX ORDER — RANOLAZINE 500 MG/1
TABLET, EXTENDED RELEASE ORAL
Qty: 180 TABLET | Refills: 2 | OUTPATIENT
Start: 2020-09-03

## 2020-09-03 RX ORDER — PANTOPRAZOLE SODIUM 40 MG/1
TABLET, DELAYED RELEASE ORAL
Qty: 90 TABLET | Refills: 0 | OUTPATIENT
Start: 2020-09-03

## 2020-09-03 RX ORDER — SIMVASTATIN 40 MG
TABLET ORAL
Qty: 45 TABLET | Refills: 0 | OUTPATIENT
Start: 2020-09-03

## 2020-09-09 RX ORDER — SIMVASTATIN 40 MG
20 TABLET ORAL NIGHTLY
Qty: 45 TABLET | Refills: 0 | Status: SHIPPED | OUTPATIENT
Start: 2020-09-09 | End: 2020-12-21

## 2020-09-09 RX ORDER — PANTOPRAZOLE SODIUM 40 MG/1
40 TABLET, DELAYED RELEASE ORAL DAILY
Qty: 90 TABLET | Refills: 0 | Status: SHIPPED | OUTPATIENT
Start: 2020-09-09 | End: 2020-12-21

## 2020-09-10 RX ORDER — SIMVASTATIN 40 MG
TABLET ORAL
Qty: 45 TABLET | Refills: 0 | OUTPATIENT
Start: 2020-09-10

## 2020-09-10 RX ORDER — RANOLAZINE 500 MG/1
TABLET, EXTENDED RELEASE ORAL
Qty: 180 TABLET | Refills: 2 | Status: SHIPPED | OUTPATIENT
Start: 2020-09-10 | End: 2021-06-01 | Stop reason: SDUPTHER

## 2020-09-10 RX ORDER — PANTOPRAZOLE SODIUM 40 MG/1
TABLET, DELAYED RELEASE ORAL
Qty: 90 TABLET | Refills: 0 | OUTPATIENT
Start: 2020-09-10

## 2020-09-19 ENCOUNTER — LAB (OUTPATIENT)
Dept: LAB | Facility: HOSPITAL | Age: 70
End: 2020-09-19

## 2020-09-19 PROCEDURE — U0003 INFECTIOUS AGENT DETECTION BY NUCLEIC ACID (DNA OR RNA); SEVERE ACUTE RESPIRATORY SYNDROME CORONAVIRUS 2 (SARS-COV-2) (CORONAVIRUS DISEASE [COVID-19]), AMPLIFIED PROBE TECHNIQUE, MAKING USE OF HIGH THROUGHPUT TECHNOLOGIES AS DESCRIBED BY CMS-2020-01-R: HCPCS | Performed by: INTERNAL MEDICINE

## 2020-09-19 PROCEDURE — C9803 HOPD COVID-19 SPEC COLLECT: HCPCS | Performed by: INTERNAL MEDICINE

## 2020-09-20 LAB
COVID LABCORP PRIORITY: NORMAL
SARS-COV-2 RNA RESP QL NAA+PROBE: NOT DETECTED

## 2020-09-22 ENCOUNTER — HOSPITAL ENCOUNTER (OUTPATIENT)
Facility: HOSPITAL | Age: 70
Setting detail: HOSPITAL OUTPATIENT SURGERY
Discharge: HOME | End: 2020-09-22
Attending: INTERNAL MEDICINE | Admitting: INTERNAL MEDICINE

## 2020-09-22 ENCOUNTER — ANESTHESIA (OUTPATIENT)
Dept: GASTROENTEROLOGY | Facility: HOSPITAL | Age: 70
End: 2020-09-22

## 2020-09-22 ENCOUNTER — ANESTHESIA EVENT (OUTPATIENT)
Dept: GASTROENTEROLOGY | Facility: HOSPITAL | Age: 70
End: 2020-09-22

## 2020-09-22 VITALS
TEMPERATURE: 96.8 F | OXYGEN SATURATION: 94 % | BODY MASS INDEX: 34.13 KG/M2 | HEIGHT: 66 IN | DIASTOLIC BLOOD PRESSURE: 66 MMHG | HEART RATE: 56 BPM | WEIGHT: 212.4 LBS | RESPIRATION RATE: 18 BRPM | SYSTOLIC BLOOD PRESSURE: 120 MMHG

## 2020-09-22 DIAGNOSIS — R19.4 CHANGE IN BOWEL HABITS: ICD-10-CM

## 2020-09-22 PROCEDURE — 88305 TISSUE EXAM BY PATHOLOGIST: CPT

## 2020-09-22 PROCEDURE — 25010000002 PROPOFOL 10 MG/ML EMULSION: Performed by: NURSE ANESTHETIST, CERTIFIED REGISTERED

## 2020-09-22 RX ORDER — PROPOFOL 10 MG/ML
VIAL (ML) INTRAVENOUS AS NEEDED
Status: DISCONTINUED | OUTPATIENT
Start: 2020-09-22 | End: 2020-09-22 | Stop reason: SURG

## 2020-09-22 RX ORDER — PROMETHAZINE HYDROCHLORIDE 25 MG/1
25 TABLET ORAL ONCE AS NEEDED
Status: DISCONTINUED | OUTPATIENT
Start: 2020-09-22 | End: 2020-09-22 | Stop reason: HOSPADM

## 2020-09-22 RX ORDER — ONDANSETRON 2 MG/ML
4 INJECTION INTRAMUSCULAR; INTRAVENOUS ONCE AS NEEDED
Status: DISCONTINUED | OUTPATIENT
Start: 2020-09-22 | End: 2020-09-22 | Stop reason: HOSPADM

## 2020-09-22 RX ORDER — MEPERIDINE HYDROCHLORIDE 25 MG/ML
12.5 INJECTION INTRAMUSCULAR; INTRAVENOUS; SUBCUTANEOUS
Status: DISCONTINUED | OUTPATIENT
Start: 2020-09-22 | End: 2020-09-22 | Stop reason: HOSPADM

## 2020-09-22 RX ORDER — PROMETHAZINE HYDROCHLORIDE 25 MG/1
25 SUPPOSITORY RECTAL ONCE AS NEEDED
Status: DISCONTINUED | OUTPATIENT
Start: 2020-09-22 | End: 2020-09-22 | Stop reason: HOSPADM

## 2020-09-22 RX ORDER — DEXTROSE AND SODIUM CHLORIDE 5; .45 G/100ML; G/100ML
30 INJECTION, SOLUTION INTRAVENOUS CONTINUOUS PRN
Status: DISCONTINUED | OUTPATIENT
Start: 2020-09-22 | End: 2020-09-22 | Stop reason: HOSPADM

## 2020-09-22 RX ADMIN — DEXTROSE AND SODIUM CHLORIDE 30 ML/HR: 5; 450 INJECTION, SOLUTION INTRAVENOUS at 08:59

## 2020-09-22 RX ADMIN — GLYCOPYRROLATE 200 MCG: 0.2 INJECTION, SOLUTION INTRAMUSCULAR; INTRAVITREAL at 09:22

## 2020-09-22 RX ADMIN — PROPOFOL 20 MG: 10 INJECTION, EMULSION INTRAVENOUS at 09:47

## 2020-09-22 RX ADMIN — PROPOFOL 30 MG: 10 INJECTION, EMULSION INTRAVENOUS at 09:42

## 2020-09-22 RX ADMIN — PROPOFOL 50 MG: 10 INJECTION, EMULSION INTRAVENOUS at 09:36

## 2020-09-22 RX ADMIN — PROPOFOL 50 MG: 10 INJECTION, EMULSION INTRAVENOUS at 09:34

## 2020-09-22 NOTE — H&P
Cody Moore DO,[facility]  Gastroenterology  Hepatology  Endoscopy  Board Certified in Internal Medicine and gastroenterology  [address], [address]  [place]. [address]  - [phone]   F - [phone]     GASTROENTEROLOGY HISTORY AND PHYSICAL  NOTE   CODY MOORE DO.         SUBJECTIVE:   9/22/2020    Name: Justin Baer  DOD: 1950        Chief Complaint:       Subjective : Personal history of colon polyps.  Family history of colon cancer.  Changes in bowel habits.  Last colonoscopy 2016    Patient is 70 y.o. male presents with desire for elective colonoscopy.      ROS/HISTORY/ CURRENT MEDICATIONS/OBJECTIVE/VS/PE:   Review of Systems:  All systems unremarkable unless specified below.  Constitutional   HENT  Eyes   Respiratory    Cardiovascular  Gastrointestinal   Endocrine  Genitourinary    Musculoskeletal   Skin  Allergic/Immunologic    Neurological    Hematological  Psychiatric/Behavioral    History:     Past Medical History:   Diagnosis Date   • Angina pectoris (CMS/HCC)    • Coronary artery disease    • Dyspnea    • Essential hypertension, malignant    • Hyperlipidemia    • Kidney stone    • Myocardial infarction (CMS/HCC)    • Palpitations    • Renal stones    • Ventricular fibrillation (CMS/HCC)      Past Surgical History:   Procedure Laterality Date   • APPENDECTOMY     • CARDIAC CATHETERIZATION     • CARDIAC CATHETERIZATION N/A 3/29/2017    Procedure: Coronary angiography;  Surgeon: Bolivar Ochoa MD;  Location: [facility] CATH INVASIVE LOCATION;  Service:    • CARDIAC CATHETERIZATION  3/29/2017    Procedure: Functional Flow [place];  Surgeon: Bolivar Ochoa MD;  Location: [facility] CATH INVASIVE LOCATION;  Service:    • CORONARY ANGIOPLASTY     • CORONARY STENT PLACEMENT     • HERNIA REPAIR     • KIDNEY STONE SURGERY     • MO RT/LT HEART CATHETERS N/A 3/29/2017    Procedure: Percutaneous Coronary Intervention;  Surgeon: Bolivar Ochoa MD;  Location: [facility]  INVASIVE LOCATION;  Service: Cardiovascular   • VASECTOMY       History reviewed. No pertinent family history.  Social History     Tobacco Use   • Smoking status: Former Smoker     Packs/day: 1.50     Years: 40.00     Pack years: 60.00     Types: Cigarettes     Start date: 1970     Quit date: 2010     Years since quitting: 10.7   • Smokeless tobacco: Never Used   Substance Use Topics   • Alcohol use: No   • Drug use: No     Prior to Admission medications    Medication Sig Start Date End Date Taking? Authorizing Provider   aspirin 81 MG EC tablet Take 81 mg by mouth Daily.   Yes ProviderJacqueline MD   BRILINTA 90 MG tablet tablet TAKE ONE TABLET BY MOUTH TWICE A DAY 6/6/19  Yes Javi Landry MD   lisinopril (PRINIVIL,ZESTRIL) 5 MG tablet Take 1 tablet by mouth Daily. 3/27/18  Yes Javi Landry MD   nitroglycerin (NITROSTAT) 0.4 MG SL tablet Place 1 tablet under the tongue Every 5 (Five) Minutes As Needed for Chest Pain for up to 3 doses. Take no more than 3 doses in 15 minutes. 3/30/17  Yes Ozzy Lopez MD   pantoprazole (PROTONIX) 40 MG EC tablet Take 1 tablet by mouth Daily. 9/9/20  Yes Javi Landry MD   ranolazine (RANEXA) 500 MG 12 hr tablet TAKE ONE TABLET BY MOUTH TWICE A DAY 9/10/20  Yes Javi Landry MD   simvastatin (ZOCOR) 40 MG tablet Take 0.5 tablets by mouth Every Night. 9/9/20  Yes Javi Landry MD     Allergies:  Anaprox [naproxen sodium], Ibuprofen, Iodinated diagnostic agents, and Iodine    I have reviewed the patients medical history, surgical history and family history in the available medical record system.     Current Medications:     Current Facility-Administered Medications   Medication Dose Route Frequency Provider Last Rate Last Dose   • dextrose 5 % and sodium chloride 0.45 % infusion  30 mL/hr Intravenous Continuous PRN Jose Carlos Balderas DO 30 mL/hr at 09/22/20 0859     • meperidine (DEMEROL) injection 12.5 mg   12.5 mg Intravenous Q5 Min PRN Wong Alvarenga CRNA       • ondansetron (ZOFRAN) injection 4 mg  4 mg Intravenous Once PRN Wong Alvarenga CRNA       • promethazine (PHENERGAN) suppository 25 mg  25 mg Rectal Once PRN Wong Alvarenga CRNA        Or   • promethazine (PHENERGAN) tablet 25 mg  25 mg Oral Once PRN Wong Alvarenga CRNA           Objective     Physical Exam:   Temp:  [96.9 °F (36.1 °C)] 96.9 °F (36.1 °C)  Heart Rate:  [57] 57  Resp:  [18] 18  BP: (156)/(72) 156/72    Physical Exam:  General Appearance:    Alert, cooperative, in no acute distress   Head:    Normocephalic, without obvious abnormality, atraumatic   Eyes:            Lids and lashes normal, conjunctivae and sclerae normal, no   icterus, no pallor, corneas clear, PERRLA   Ears:    Ears appear intact with no abnormalities noted   Throat:   No oral lesions, no thrush, oral mucosa moist   Neck:   No adenopathy, supple, trachea midline, no thyromegaly, no     carotid bruit, no JVD   Back:     No kyphosis present, no scoliosis present, no skin lesions,       erythema or scars, no tenderness to percussion or                   palpation,   range of motion normal   Lungs:     Clear to auscultation,respirations regular, even and                   unlabored    Heart:    Regular rhythm and normal rate, normal S1 and S2, no            murmur, no gallop, no rub, no click   Breast Exam:    Deferred   Abdomen:     Normal bowel sounds, no masses, no organomegaly, soft        non-tender, non-distended, no guarding, no rebound                 tenderness   Genitalia:    Deferred   Extremities:   Moves all extremities well, no edema, no cyanosis, no              redness   Pulses:   Pulses palpable and equal bilaterally   Skin:   No bleeding, bruising or rash   Lymph nodes:   No palpable adenopathy   Neurologic:   Cranial nerves 2 - 12 grossly intact, sensation intact, DTR        present and equal bilaterally      Results Review:     Lab Results   Component  Value Date    WBC 9.29 03/30/2017    WBC 5.22 03/27/2017    WBC 5.5 10/09/2015    HGB 12.6 (L) 03/30/2017    HGB 14.2 03/27/2017    HGB 13.9 10/09/2015    HCT 35.6 (L) 03/30/2017    HCT 41.5 03/27/2017    HCT 40.7 10/09/2015     03/30/2017     03/27/2017     10/09/2015             No results found for: LIPASE  Lab Results   Component Value Date    INR 1.01 03/27/2017     No results found for: CULTURE    Radiology Review:  Imaging Results (Last 72 Hours)     ** No results found for the last 72 hours. **           I reviewed the patient's new clinical results.  I reviewed the patient's new imaging results and agree with the interpretation.     ASSESSMENT/PLAN:   ASSESSMENT:  1.  Personal history of colon polyp  2.  Family history of colon cancer  3.  Changes in bowel habits with occasional bleeding    PLAN:  1.  Colonoscopy    Risk and benefits associated with the procedure are reviewed with the patient.  Patient wished to proceed     Jose Carlos Balderas DO  09/22/20  09:24 CDT

## 2020-09-22 NOTE — ANESTHESIA POSTPROCEDURE EVALUATION
Patient: Justin Baer    Procedure Summary     Date: 09/22/20 Room / Location: [facility] ENDOSCOPY 2 / [facility] ENDOSCOPY    Anesthesia Start: 0922 Anesthesia Stop: 0953    Procedure: COLONOSCOPY (N/A ) Diagnosis:       Change in bowel habits      (Change in bowel habits [R19.4])    Surgeon: Jose Carlos Balderas DO Provider: Wong Alvarenga CRNA    Anesthesia Type: Not recorded ASA Status: 2          Anesthesia Type: No value filed.    Vitals  No vitals data found for the desired time range.          Post Anesthesia Care and Evaluation    Patient location during evaluation: bedside  Patient participation: complete - patient cannot participate  Level of consciousness: awake  Pain score: 0  Pain management: adequate  Airway patency: patent  Anesthetic complications: No anesthetic complications  PONV Status: none  Cardiovascular status: acceptable  Respiratory status: acceptable  Hydration status: acceptable

## 2020-09-22 NOTE — ANESTHESIA PREPROCEDURE EVALUATION
Anesthesia Evaluation     NPO Solid Status: > 8 hours  NPO Liquid Status: > 8 hours           Airway   Mallampati: III  TM distance: >3 FB  Neck ROM: full  No difficulty expected  Dental - normal exam     Pulmonary    (+) shortness of breath,   Cardiovascular - normal exam    (+) hypertension, past MI , CAD, angina, hyperlipidemia,       Neuro/Psych  GI/Hepatic/Renal/Endo    (+)   renal disease,     Musculoskeletal     Abdominal    Substance History      OB/GYN          Other                        Anesthesia Plan    ASA 2     intravenous induction     Anesthetic plan, all risks, benefits, and alternatives have been provided, discussed and informed consent has been obtained with: patient.

## 2020-09-23 LAB
LAB AP CASE REPORT: NORMAL
PATH REPORT.FINAL DX SPEC: NORMAL

## 2020-11-04 ENCOUNTER — OFFICE VISIT (OUTPATIENT)
Dept: CARDIOLOGY | Facility: CLINIC | Age: 70
End: 2020-11-04

## 2020-11-04 VITALS
OXYGEN SATURATION: 99 % | WEIGHT: 209.4 LBS | HEIGHT: 66 IN | HEART RATE: 51 BPM | DIASTOLIC BLOOD PRESSURE: 80 MMHG | SYSTOLIC BLOOD PRESSURE: 126 MMHG | BODY MASS INDEX: 33.65 KG/M2

## 2020-11-04 DIAGNOSIS — Z98.61 S/P PTCA (PERCUTANEOUS TRANSLUMINAL CORONARY ANGIOPLASTY): ICD-10-CM

## 2020-11-04 DIAGNOSIS — I25.10 CHRONIC CORONARY ARTERY DISEASE: Primary | ICD-10-CM

## 2020-11-04 DIAGNOSIS — Z86.74 H/O CARDIAC ARREST: ICD-10-CM

## 2020-11-04 PROCEDURE — 99214 OFFICE O/P EST MOD 30 MIN: CPT | Performed by: INTERNAL MEDICINE

## 2020-11-04 PROCEDURE — 93000 ELECTROCARDIOGRAM COMPLETE: CPT | Performed by: INTERNAL MEDICINE

## 2020-11-04 NOTE — PROGRESS NOTES
Justin Baer  70 y.o. male    1. Chronic coronary artery disease    2. H/O cardiac arrest    3. S/P PTCA (percutaneous transluminal coronary angioplasty)        History of Present Illness  Mr. Baer is here for follow-up of his above-stated problems. He has done reasonably well from a cardiac standpoint denied any chest pain, shortness of breath or palpitation.  He has been compliant with his medications.  [facility] and I understand that he had blood work done.  I do not have a copy of the results and he will bring this for our review.     He has had history of PCI to left anterior descending coronary artery in 2010 and an acute MI/cardiac arrest setting, PCI to left circumflex coronary artery and obtuse marginal coronary artery in 2012 and PCI to ostial left circumflex coronary artery in 3/ 2017.    Clinical exam was unremarkable and his blood pressure was in the normal range.  His lipid profiles have been in the normal range in the past.  He denied any palpitation, dizziness or syncope.  He is able to perform his activities of daily living without any restrictions.    EKG today showed sinus rhythm with heart rate of 52 bpm.  Baseline artifacts.  QTc interval 401 ms.  No ST-T wave changes suggestive of ischemia.    SUBJECTIVE    Allergies   Allergen Reactions   • Anaprox [Naproxen Sodium] Other (See Comments)     Caused pt to pass out.   • Ibuprofen Other (See Comments)     Caused pt to pass out.   • Iodinated Diagnostic Agents    • Iodine          Past Medical History:   Diagnosis Date   • Angina pectoris (CMS/HCC)    • Coronary artery disease    • Dyspnea    • Essential hypertension, malignant    • Hyperlipidemia    • Kidney stone    • Myocardial infarction (CMS/HCC)    • Palpitations    • Renal stones    • Ventricular fibrillation (CMS/HCC)          Past Surgical History:   Procedure Laterality Date   • APPENDECTOMY     • CARDIAC CATHETERIZATION     • CARDIAC CATHETERIZATION  N/A 3/29/2017    Procedure: Coronary angiography;  Surgeon: Bolivar Ochoa MD;  Location: [facility] CATH INVASIVE LOCATION;  Service:    • CARDIAC CATHETERIZATION  3/29/2017    Procedure: Functional Flow [place];  Surgeon: Bolivar Ochoa MD;  Location: [facility] CATH INVASIVE LOCATION;  Service:    • COLONOSCOPY N/A 9/22/2020    Procedure: COLONOSCOPY;  Surgeon: Jose Carlos Balderas DO;  Location: [facility] ENDOSCOPY;  Service: Gastroenterology;  Laterality: N/A;   • CORONARY ANGIOPLASTY     • CORONARY STENT PLACEMENT     • HERNIA REPAIR     • KIDNEY STONE SURGERY     • [place] RT/LT HEART CATHETERS N/A 3/29/2017    Procedure: Percutaneous Coronary Intervention;  Surgeon: Bolivar Ochoa MD;  Location: [facility] CATH INVASIVE LOCATION;  Service: Cardiovascular   • VASECTOMY           History reviewed. No pertinent family history.      Social History     Socioeconomic History   • Marital status:      Spouse name: Not on file   • Number of children: Not on file   • Years of education: Not on file   • Highest education level: Not on file   Tobacco Use   • Smoking status: Former Smoker     Packs/day: 1.50     Years: 40.00     Pack years: 60.00     Types: Cigarettes     Start date: 1970     Quit date: 2010     Years since quitting: 10.8   • Smokeless tobacco: Never Used   Substance and Sexual Activity   • Alcohol use: No   • Drug use: No   • Sexual activity: Defer         Current Outpatient Medications   Medication Sig Dispense Refill   • aspirin 81 MG EC tablet Take 81 mg by mouth Daily.     • BRILINTA 90 MG tablet tablet TAKE ONE TABLET BY MOUTH TWICE A  tablet 3   • lisinopril (PRINIVIL,ZESTRIL) 5 MG tablet Take 1 tablet by mouth Daily. 90 tablet 3   • nitroglycerin (NITROSTAT) 0.4 MG SL tablet Place 1 tablet under the tongue Every 5 (Five) Minutes As Needed for Chest Pain for up to 3 doses. Take no more than 3 doses in 15 minutes. 20 tablet 11   • pantoprazole (PROTONIX) 40 MG EC tablet Take 1 tablet by  "mouth Daily. 90 tablet 0   • ranolazine (RANEXA) 500 MG 12 hr tablet TAKE ONE TABLET BY MOUTH TWICE A  tablet 2   • simvastatin (ZOCOR) 40 MG tablet Take 0.5 tablets by mouth Every Night. 45 tablet 0     No current facility-administered medications for this visit.          OBJECTIVE    /80 (BP Location: Left arm, Patient Position: Sitting, Cuff Size: Adult)   Pulse 51   Ht 167.6 cm (66\")   Wt 95 kg (209 lb 6.4 oz)   SpO2 99%   BMI 33.80 kg/m²         Review of Systems     Constitutional:  Denies recent weight loss, weight gain, fever or chills, no change in exercise tolerance     HENT:  hearing loss, no epistaxis, hoarseness, or difficulty speaking.     Eyes: Wears eyeglasses or contact lenses     Respiratory:  Denies dyspnea with exertion,no cough, wheezing, or hemoptysis.     Cardiovascular: Negative for palpations, chest pain, orthopnea, PND    Gastrointestinal:  Denies change in bowel habits, dyspepsia, ulcer disease, hematochezia, or melena.     Endocrine: Negative for cold intolerance, heat intolerance, polydipsia, polyphagia and polyuria.     Genitourinary: Negative.      Musculoskeletal: Denies any history of arthritic symptoms or back problems     Skin:  Denies any change in hair or nails, rashes, or skin lesions.     Allergic/Immunologic: Negative.  Negative for environmental allergies, food allergies and immunocompromised state.     Neurological:  Denies any history of recurrent headaches, strokes, TIA, or seizure disorder.     Hematological: Denies any food allergies, seasonal allergies, bleeding disorders, or lymphadenopathy.     Psychiatric/Behavioral: Denies any history of depression, substance abuse, or change in cognitive function.       Physical Exam     Constitutional: Cooperative, alert and oriented,  in no acute distress.     HENT:   Head: Normocephalic, normal hair patterns, no masses or tenderness.  Ears, Nose, and Throat: No gross abnormalities. No pallor or cyanosis. "   Eyes: EOMS intact, PERRL, conjunctivae and lids unremarkable. Fundoscopic exam and visual fields not performed.   Neck: No palpable masses or adenopathy, no thyromegaly, no JVD, carotid pulses are full and equal bilaterally and without  Bruits.     Cardiovascular: Regular rhythm, S1 and S2 normal, no S3 or S4.  No murmurs, gallops, or rubs detected.     Pulmonary/Chest: Chest: normal symmetry, normal respiratory excursion, no intercostal retraction, no use of accessory muscles.            Pulmonary: Normal breath sounds. No rales or ronchi.    Abdominal: Abdomen soft, bowel sounds normoactive, no masses, no hepatosplenomegaly, non-tender, no bruits.     Musculoskeletal: No deformities, clubbing, cyanosis, erythema, or edema observed.     Neurological: No gross motor or sensory deficits noted, affect appropriate, oriented to time, person, place.     Skin: Warm and dry to the touch, no apparent skin lesions or masses noted.     Psychiatric: He has a normal mood and affect. His behavior is normal. Judgment and thought content normal.         Procedures      Lab Results   Component Value Date    WBC 9.29 03/30/2017    HGB 12.6 (L) 03/30/2017    HCT 35.6 (L) 03/30/2017    MCV 91.0 03/30/2017     03/30/2017     Lab Results   Component Value Date    GLUCOSE 102 (H) 03/30/2017    BUN 22 (H) 03/30/2017    CREATININE 0.94 03/30/2017    EGFRIFNONA 80 03/30/2017    BCR 23.4 03/30/2017    CO2 23.0 03/30/2017    CALCIUM 8.4 03/30/2017    ALBUMIN 4.30 03/27/2017    AST 27 03/27/2017    ALT 26 03/27/2017     Lab Results   Component Value Date    CHOL 125 03/30/2017     Lab Results   Component Value Date    TRIG 59 03/30/2017    TRIG 93 10/09/2015     Lab Results   Component Value Date    HDL 47 (L) 03/30/2017     No components found for: LDLCALC  Lab Results   Component Value Date    LDL 56 03/30/2017    LDL 65 10/09/2015     No results found for: HDLLDLRATIO  No components found for: CHOLHDL  No results found for:  HGBA1C  Lab Results   Component Value Date    TSH 2.900 03/27/2017           ASSESSMENT AND PLAN  Mr. Baer is stable with no evidence of angina, arrhythmia or congestive heart failure.  Antiplatelet therapy with aspirin and Plavix, antianginal therapy with Ranexa, lipid-lowering therapy with simvastatin and antihypertensive therapy with lisinopril have been continued.  He has had lab work done by Dr. Mccollum recently and a copy of the results will be obtained for review.  He will be seen again in 6 months.    Diagnoses and all orders for this visit:    1. Chronic coronary artery disease (Primary)  -     ECG 12 Lead    2. H/O cardiac arrest    3. S/P PTCA (percutaneous transluminal coronary angioplasty)        Patient's Body mass index is 33.8 kg/m². BMI is above normal parameters. Recommendations include: exercise counseling and nutrition counseling.  Patient is a non-smoker.      Javi Landry MD  11/4/2020  09:49 CST

## 2020-11-05 LAB
QT INTERVAL: 432 MS
QTC INTERVAL: 401 MS

## 2020-12-05 DIAGNOSIS — I25.10 CHRONIC CORONARY ARTERY DISEASE: Primary | ICD-10-CM

## 2020-12-15 ENCOUNTER — TELEPHONE (OUTPATIENT)
Dept: CARDIOLOGY | Facility: CLINIC | Age: 70
End: 2020-12-15

## 2020-12-15 NOTE — TELEPHONE ENCOUNTER
Called Delmis back in regards to her husbands medicine refills. I informed her that Dr. Sofia would not be in the office until Monday. However, I called [facility] beforehand and asked them if they could give Justin 7 days worth of medicine to hold him over until Dr. Sofia can approved the refills. The pharmacist said they would be able to do that. I informed Delmis of this and she voiced her understanding.

## 2020-12-21 RX ORDER — SIMVASTATIN 40 MG
TABLET ORAL
Qty: 45 TABLET | Refills: 1 | Status: SHIPPED | OUTPATIENT
Start: 2020-12-21 | End: 2021-06-22

## 2020-12-21 RX ORDER — PANTOPRAZOLE SODIUM 40 MG/1
TABLET, DELAYED RELEASE ORAL
Qty: 90 TABLET | Refills: 1 | Status: SHIPPED | OUTPATIENT
Start: 2020-12-21 | End: 2021-06-22

## 2021-06-01 RX ORDER — RANOLAZINE 500 MG/1
500 TABLET, EXTENDED RELEASE ORAL 2 TIMES DAILY
Qty: 60 TABLET | Refills: 0 | Status: SHIPPED | OUTPATIENT
Start: 2021-06-01 | End: 2021-08-03 | Stop reason: SDUPTHER

## 2021-06-01 NOTE — TELEPHONE ENCOUNTER
----- Message from Gunner Dillon sent at 6/1/2021  8:08 AM CDT -----  Dr. Bismark Chatterjee in [place]     Needs his Ranexa refilled     Thanks    Gunner

## 2021-06-22 DIAGNOSIS — I25.10 CHRONIC CORONARY ARTERY DISEASE: ICD-10-CM

## 2021-06-22 RX ORDER — SIMVASTATIN 40 MG
TABLET ORAL
Qty: 45 TABLET | Refills: 3 | Status: SHIPPED | OUTPATIENT
Start: 2021-06-22 | End: 2022-07-11 | Stop reason: SDUPTHER

## 2021-06-22 RX ORDER — PANTOPRAZOLE SODIUM 40 MG/1
TABLET, DELAYED RELEASE ORAL
Qty: 90 TABLET | Refills: 3 | Status: SHIPPED | OUTPATIENT
Start: 2021-06-22 | End: 2022-07-11

## 2021-08-03 RX ORDER — RANOLAZINE 500 MG/1
500 TABLET, EXTENDED RELEASE ORAL 2 TIMES DAILY
Qty: 180 TABLET | Refills: 3 | Status: SHIPPED | OUTPATIENT
Start: 2021-08-03 | End: 2022-09-02 | Stop reason: SDUPTHER

## 2021-10-12 ENCOUNTER — OFFICE VISIT (OUTPATIENT)
Dept: CARDIOLOGY | Facility: CLINIC | Age: 71
End: 2021-10-12

## 2021-10-12 VITALS
DIASTOLIC BLOOD PRESSURE: 72 MMHG | TEMPERATURE: 96.1 F | BODY MASS INDEX: 33.27 KG/M2 | SYSTOLIC BLOOD PRESSURE: 142 MMHG | WEIGHT: 207 LBS | HEIGHT: 66 IN | OXYGEN SATURATION: 98 % | HEART RATE: 50 BPM

## 2021-10-12 DIAGNOSIS — I25.10 CHRONIC CORONARY ARTERY DISEASE: ICD-10-CM

## 2021-10-12 DIAGNOSIS — E78.2 MIXED HYPERLIPIDEMIA: ICD-10-CM

## 2021-10-12 DIAGNOSIS — R00.1 SINUS BRADYCARDIA: ICD-10-CM

## 2021-10-12 DIAGNOSIS — Z86.74 H/O CARDIAC ARREST: Primary | ICD-10-CM

## 2021-10-12 LAB
QT INTERVAL: 436 MS
QTC INTERVAL: 397 MS

## 2021-10-12 PROCEDURE — 99213 OFFICE O/P EST LOW 20 MIN: CPT | Performed by: INTERNAL MEDICINE

## 2021-10-12 PROCEDURE — 93000 ELECTROCARDIOGRAM COMPLETE: CPT | Performed by: INTERNAL MEDICINE

## 2021-10-12 RX ORDER — DIPHENOXYLATE HYDROCHLORIDE AND ATROPINE SULFATE 2.5; .025 MG/1; MG/1
TABLET ORAL DAILY
COMMUNITY

## 2021-10-12 RX ORDER — DEXTROMETHORPHAN HYDROBROMIDE AND PROMETHAZINE HYDROCHLORIDE 15; 6.25 MG/5ML; MG/5ML
SYRUP ORAL
COMMUNITY
Start: 2021-06-17 | End: 2022-05-27

## 2021-10-12 NOTE — PROGRESS NOTES
Justin Baer  71 y.o. male    1. H/O cardiac arrest    2. Chronic coronary artery disease    3. Mixed hyperlipidemia    4. Sinus bradycardia        History of Present Illness  Mr. Baer is here for follow-up of his above-stated problems.  The patient denied any cardiac symptoms today and no chest pain shortness breath or palpitation was reported.  He has been compliant with medications.  [facility].  He has had history of PCI to left anterior descending coronary artery in 2010 and an acute MI/cardiac arrest setting, PCI to left circumflex coronary artery and obtuse marginal coronary artery in 2012 and PCI to ostial left circumflex coronary artery in 3/ 2017.  He does have asymptomatic sinus bradycardia and is tolerated a lower heart rate quite well.  EKG today showed sinus rhythm with heart rate of 50 bpm.  Baseline artifacts.  QTc interval 397 ms.  No ST-T wave changes diagnostic of ischemia.    [facility] showed normal electrolytes with a creatinine of 0.9 and BUN of 17.  Hemoglobin and hematocrit were within normal limits.  Cholesterol was 131 with a triglyceride of 66, LDL of 70 and HDL of 48.  AST and ALT were within normal limits.  TSH was within normal limit at 2.358.    Allergies   Allergen Reactions   • Anaprox [Naproxen Sodium] Other (See Comments)     Caused pt to pass out.   • Ibuprofen Other (See Comments)     Caused pt to pass out.   • Iodinated Diagnostic Agents    • Iodine          Past Medical History:   Diagnosis Date   • Angina pectoris (HCC)    • Coronary artery disease    • Dyspnea    • Essential hypertension, malignant    • Hyperlipidemia    • Kidney stone    • Myocardial infarction (HCC)    • Palpitations    • Renal stones    • Ventricular fibrillation (HCC)          Past Surgical History:   Procedure Laterality Date   • APPENDECTOMY     • CARDIAC CATHETERIZATION     • CARDIAC CATHETERIZATION N/A 3/29/2017     Procedure: Coronary angiography;  Surgeon: Bolivar Ochoa MD;  Location: [facility] CATH INVASIVE LOCATION;  Service:    • CARDIAC CATHETERIZATION  3/29/2017    Procedure: Functional Flow [place];  Surgeon: Bolivar Ochoa MD;  Location: [facility] CATH INVASIVE LOCATION;  Service:    • COLONOSCOPY N/A 2020    Procedure: COLONOSCOPY;  Surgeon: Jose Carlos Balderas DO;  Location: [facility] ENDOSCOPY;  Service: Gastroenterology;  Laterality: N/A;   • CORONARY ANGIOPLASTY     • CORONARY STENT PLACEMENT     • HERNIA REPAIR     • KIDNEY STONE SURGERY     • [place] RT/LT HEART CATHETERS N/A 3/29/2017    Procedure: Percutaneous Coronary Intervention;  Surgeon: Bolivar Ochoa MD;  Location: [facility] CATH INVASIVE LOCATION;  Service: Cardiovascular   • VASECTOMY           History reviewed. No pertinent family history.      Social History     Socioeconomic History   • Marital status:    Tobacco Use   • Smoking status: Former Smoker     Packs/day: 1.50     Years: 40.00     Pack years: 60.00     Types: Cigarettes     Start date:      Quit date:      Years since quittin.7   • Smokeless tobacco: Never Used   Substance and Sexual Activity   • Alcohol use: No   • Drug use: No   • Sexual activity: Defer         Current Outpatient Medications   Medication Sig Dispense Refill   • aspirin 81 MG EC tablet Take 81 mg by mouth Daily.     • BRILINTA 90 MG tablet tablet TAKE ONE TABLET BY MOUTH TWICE A  tablet 3   • lisinopril (PRINIVIL,ZESTRIL) 5 MG tablet Take 1 tablet by mouth Daily. 90 tablet 3   • multivitamin (MULTI VITAMIN MENS PO) Take  by mouth Daily.     • nitroglycerin (NITROSTAT) 0.4 MG SL tablet Place 1 tablet under the tongue Every 5 (Five) Minutes As Needed for Chest Pain for up to 3 doses. Take no more than 3 doses in 15 minutes. 20 tablet 11   • pantoprazole (PROTONIX) 40 MG EC tablet TAKE ONE TABLET BY MOUTH DAILY 90 tablet 3   • ranolazine (RANEXA) 500 MG 12 hr tablet Take 1 tablet by mouth 2  "(Two) Times a Day. 180 tablet 3   • simvastatin (ZOCOR) 40 MG tablet TAKE ONE-HALF TABLET BY MOUTH ONCE NIGHTLY 45 tablet 3   • promethazine-dextromethorphan (PROMETHAZINE-DM) 6.25-15 MG/5ML syrup 1-2 tsp every 6 hours as needed for cough       No current facility-administered medications for this visit.         OBJECTIVE    /72 (BP Location: Left arm, Patient Position: Sitting, Cuff Size: Adult)   Pulse 50   Temp 96.1 °F (35.6 °C)   Ht 167.6 cm (66\")   Wt 93.9 kg (207 lb)   SpO2 98%   BMI 33.41 kg/m²         Review of Systems : The following systems are reviewed and no changes noted    Constitutional:  Denies recent weight loss, weight gain, fever or chills, no change in exercise tolerance     HENT:  hearing loss, no epistaxis, hoarseness, or difficulty speaking.     Eyes: Wears eyeglasses or contact lenses     Respiratory:  Denies dyspnea with exertion,no cough, wheezing, or hemoptysis.     Cardiovascular: Negative for palpations, chest pain, orthopnea, PND    Gastrointestinal:  Denies change in bowel habits, dyspepsia, ulcer disease, hematochezia, or melena.     Endocrine: Negative for cold intolerance, heat intolerance, polydipsia, polyphagia and polyuria.     Genitourinary: Negative.      Musculoskeletal: Denies any history of arthritic symptoms or back problems     Skin:  Denies any change in hair or nails, rashes, or skin lesions.     Allergic/Immunologic: Negative.  Negative for environmental allergies, food allergies and immunocompromised state.     Neurological:  Denies any history of recurrent headaches, strokes, TIA, or seizure disorder.     Hematological: Denies any food allergies, seasonal allergies, bleeding disorders, or lymphadenopathy.     Psychiatric/Behavioral: Denies any history of depression, substance abuse, or change in cognitive function.       Physical Exam : The following systems are reviewed and no changes noted    Constitutional: Cooperative, alert and oriented,  in no acute " distress.     HENT:   Head: Normocephalic, normal hair patterns, no masses or tenderness.  Ears, Nose, and Throat: No gross abnormalities. No pallor or cyanosis.   Eyes: EOMS intact, PERRL, conjunctivae and lids unremarkable. Fundoscopic exam and visual fields not performed.   Neck: No palpable masses or adenopathy, no thyromegaly, no JVD, carotid pulses are full and equal bilaterally and without  Bruits.     Cardiovascular: Regular rhythm, S1 and S2 normal, no S3 or S4.  No murmurs, gallops, or rubs detected.     Pulmonary/Chest: Chest: normal symmetry, normal respiratory excursion, no intercostal retraction, no use of accessory muscles.            Pulmonary: Normal breath sounds. No rales or ronchi.    Abdominal: Abdomen soft, bowel sounds normoactive, no masses, no hepatosplenomegaly, non-tender, no bruits.     Musculoskeletal: No deformities, clubbing, cyanosis, erythema, or edema observed.     Neurological: No gross motor or sensory deficits noted, affect appropriate, oriented to time, person, place.     Skin: Warm and dry to the touch, no apparent skin lesions or masses noted.     Psychiatric: He has a normal mood and affect. His behavior is normal. Judgment and thought content normal.         Procedures      Lab Results   Component Value Date    WBC 9.29 03/30/2017    HGB 12.6 (L) 03/30/2017    HCT 35.6 (L) 03/30/2017    MCV 91.0 03/30/2017     03/30/2017     Lab Results   Component Value Date    GLUCOSE 102 (H) 03/30/2017    BUN 22 (H) 03/30/2017    CREATININE 0.94 03/30/2017    EGFRIFNONA 80 03/30/2017    BCR 23.4 03/30/2017    CO2 23.0 03/30/2017    CALCIUM 8.4 03/30/2017    ALBUMIN 4.30 03/27/2017    AST 27 03/27/2017    ALT 26 03/27/2017     Lab Results   Component Value Date    CHOL 125 03/30/2017     Lab Results   Component Value Date    TRIG 59 03/30/2017    TRIG 93 10/09/2015     Lab Results   Component Value Date    HDL 47 (L) 03/30/2017     No components found for: LDLCALC  Lab Results    Component Value Date    LDL 56 03/30/2017    LDL 65 10/09/2015     No results found for: HDLLDLRATIO  No components found for: CHOLHDL  No results found for: HGBA1C  Lab Results   Component Value Date    TSH 2.900 03/27/2017           ASSESSMENT AND PLAN  Mr. Baer is progressing well with no clinical evidence of progression of coronary artery disease.  No evidence of congestive heart failure.    His lipid profiles are in the normal range.  Antiplatelet therapy with aspirin and Plavix, antianginal therapy with Ranexa, lipid-lowering therapy with simvastatin and antihypertensive therapy with lisinopril have been continued.     Diagnoses and all orders for this visit:    1. H/O cardiac arrest (Primary)    2. Chronic coronary artery disease  -     ECG 12 Lead    3. Mixed hyperlipidemia    4. Sinus bradycardia        Patient's Body mass index is 33.41 kg/m². BMI is above normal parameters. Recommendations include: exercise counseling and nutrition counseling.  Patient is a non-smoker.      Javi Landry MD  10/12/2021  13:19 CDT

## 2022-05-27 ENCOUNTER — OFFICE VISIT (OUTPATIENT)
Dept: CARDIOLOGY | Facility: CLINIC | Age: 72
End: 2022-05-27

## 2022-05-27 VITALS
BODY MASS INDEX: 35.52 KG/M2 | TEMPERATURE: 97.1 F | SYSTOLIC BLOOD PRESSURE: 132 MMHG | HEART RATE: 56 BPM | WEIGHT: 221 LBS | DIASTOLIC BLOOD PRESSURE: 72 MMHG | OXYGEN SATURATION: 98 % | HEIGHT: 66 IN

## 2022-05-27 DIAGNOSIS — E78.2 MIXED HYPERLIPIDEMIA: ICD-10-CM

## 2022-05-27 DIAGNOSIS — I25.10 CHRONIC CORONARY ARTERY DISEASE: ICD-10-CM

## 2022-05-27 DIAGNOSIS — Z86.74 H/O CARDIAC ARREST: Primary | ICD-10-CM

## 2022-05-27 DIAGNOSIS — Z98.61 S/P PTCA (PERCUTANEOUS TRANSLUMINAL CORONARY ANGIOPLASTY): ICD-10-CM

## 2022-05-27 PROCEDURE — 99213 OFFICE O/P EST LOW 20 MIN: CPT | Performed by: INTERNAL MEDICINE

## 2022-05-27 NOTE — PROGRESS NOTES
Justin Baer  71 y.o. male    1. H/O cardiac arrest    2. S/P PTCA (percutaneous transluminal coronary angioplasty)    3. Mixed hyperlipidemia    4. Chronic coronary artery disease        History of Present Illness  Mr. Baer is here for follow-up of his above-stated problems. He has had history of PCI to left anterior descending coronary artery in 2010 and an acute MI/cardiac arrest setting, PCI to left circumflex coronary artery and obtuse marginal coronary artery in 2012 and PCI to ostial left circumflex coronary artery in 3/ 2017.  He does have asymptomatic sinus bradycardia and is tolerated a lower heart rate quite well.    He has progressed well and denied any cardiac symptoms with no chest pain, shortness of breath or palpitation.  He is able to perform his activities of daily living.  [facility] and will have lab work done in September this year.  A copy of the results will be obtained for our records.    [facility] showed normal electrolytes with a creatinine of 0.9 and BUN of 17.  Hemoglobin and hematocrit were within normal limits.  Cholesterol was 131 with a triglyceride of 66, LDL of 70 and HDL of 48.  AST and ALT were within normal limits.  TSH was within normal limit at 2.358.    Allergies   Allergen Reactions   • Anaprox [Naproxen Sodium] Other (See Comments)     Caused pt to pass out.   • Ibuprofen Other (See Comments)     Caused pt to pass out.   • Iodinated Diagnostic Agents    • Iodine          Past Medical History:   Diagnosis Date   • Angina pectoris (HCC)    • Coronary artery disease    • Dyspnea    • Essential hypertension, malignant    • Hyperlipidemia    • Kidney stone    • Myocardial infarction (HCC)    • Palpitations    • Renal stones    • Ventricular fibrillation (HCC)          Past Surgical History:   Procedure Laterality Date   • APPENDECTOMY     • CARDIAC CATHETERIZATION     • CARDIAC CATHETERIZATION N/A  3/29/2017    Procedure: Coronary angiography;  Surgeon: Bolivar Ochoa MD;  Location: [facility] CATH INVASIVE LOCATION;  Service:    • CARDIAC CATHETERIZATION  3/29/2017    Procedure: Functional Flow [place];  Surgeon: Bolivar Ochoa MD;  Location: [facility] CATH INVASIVE LOCATION;  Service:    • COLONOSCOPY N/A 2020    Procedure: COLONOSCOPY;  Surgeon: Jose Carlos Balderas DO;  Location: [facility] ENDOSCOPY;  Service: Gastroenterology;  Laterality: N/A;   • CORONARY ANGIOPLASTY     • CORONARY STENT PLACEMENT     • HERNIA REPAIR     • KIDNEY STONE SURGERY     • WA RT/LT HEART CATHETERS N/A 3/29/2017    Procedure: Percutaneous Coronary Intervention;  Surgeon: Bolivar Ochoa MD;  Location: [facility] CATH INVASIVE LOCATION;  Service: Cardiovascular   • VASECTOMY           History reviewed. No pertinent family history.      Social History     Socioeconomic History   • Marital status:    Tobacco Use   • Smoking status: Former Smoker     Packs/day: 1.50     Years: 40.00     Pack years: 60.00     Types: Cigarettes     Start date:      Quit date:      Years since quittin.4   • Smokeless tobacco: Never Used   Substance and Sexual Activity   • Alcohol use: No   • Drug use: No   • Sexual activity: Defer         Current Outpatient Medications   Medication Sig Dispense Refill   • aspirin 81 MG EC tablet Take 81 mg by mouth Daily.     • lisinopril (PRINIVIL,ZESTRIL) 5 MG tablet Take 1 tablet by mouth Daily. 90 tablet 3   • multivitamin (THERAGRAN) tablet tablet Take  by mouth Daily.     • nitroglycerin (NITROSTAT) 0.4 MG SL tablet Place 1 tablet under the tongue Every 5 (Five) Minutes As Needed for Chest Pain for up to 3 doses. Take no more than 3 doses in 15 minutes. 20 tablet 11   • pantoprazole (PROTONIX) 40 MG EC tablet TAKE ONE TABLET BY MOUTH DAILY 90 tablet 3   • ranolazine (RANEXA) 500 MG 12 hr tablet Take 1 tablet by mouth 2 (Two) Times a Day. 180 tablet 3   • simvastatin (ZOCOR) 40 MG tablet  "TAKE ONE-HALF TABLET BY MOUTH ONCE NIGHTLY 45 tablet 3   • ticagrelor (Brilinta) 60 MG tablet tablet Take 1 tablet by mouth 2 (Two) Times a Day. 180 tablet 3     No current facility-administered medications for this visit.         OBJECTIVE    /72 (BP Location: Left arm, Patient Position: Sitting, Cuff Size: Adult)   Pulse 56   Temp 97.1 °F (36.2 °C)   Ht 167.6 cm (66\")   Wt 100 kg (221 lb)   SpO2 98%   BMI 35.67 kg/m²         Review of Systems : The following systems are reviewed and no changes noted    Constitutional:  Denies recent weight loss, weight gain, fever or chills, no change in exercise tolerance     HENT:  hearing loss, no epistaxis, hoarseness, or difficulty speaking.     Eyes: Wears eyeglasses or contact lenses     Respiratory:  Denies dyspnea with exertion,no cough, wheezing, or hemoptysis.     Cardiovascular: Negative for palpations, chest pain, orthopnea, PND    Gastrointestinal:  Denies change in bowel habits, dyspepsia, ulcer disease, hematochezia, or melena.     Endocrine: Negative for cold intolerance, heat intolerance, polydipsia, polyphagia and polyuria.     Genitourinary: Negative.      Musculoskeletal: Denies any history of arthritic symptoms or back problems     Neurological:  Denies any history of recurrent headaches, strokes, TIA, or seizure disorder.     Hematological: Denies any food allergies, seasonal allergies, bleeding disorders, or lymphadenopathy.     Psychiatric/Behavioral: Denies any history of depression, substance abuse, or change in cognitive function.       Physical Exam : The following systems are reviewed and no changes noted    Constitutional: Cooperative, alert and oriented,  in no acute distress.     HENT:   Head: Normocephalic, normal hair patterns, no masses or tenderness.  Ears, Nose, and Throat: No gross abnormalities. No pallor or cyanosis.   Eyes: EOMS intact, PERRL, conjunctivae and lids unremarkable. Fundoscopic exam and visual fields not performed. "   Neck: No palpable masses or adenopathy, no thyromegaly, no JVD, carotid pulses are full and equal bilaterally and without  Bruits.     Cardiovascular: Regular rhythm, S1 and S2 normal, no S3 or S4.  No murmurs, gallops, or rubs detected.     Pulmonary/Chest: Chest: normal symmetry, normal respiratory excursion, no intercostal retraction, no use of accessory muscles.            Pulmonary: Normal breath sounds. No rales or ronchi.    Abdominal: Abdomen soft, bowel sounds normoactive, no masses, no hepatosplenomegaly, non-tender, no bruits.     Musculoskeletal: No deformities, clubbing, cyanosis, erythema, or edema observed.     Neurological: No gross motor or sensory deficits noted, affect appropriate, oriented to time, person, place.     Skin: Warm and dry to the touch, no apparent skin lesions or masses noted.     Psychiatric: He has a normal mood and affect. His behavior is normal. Judgment and thought content normal.         Procedures      Lab Results   Component Value Date    WBC 9.29 03/30/2017    HGB 12.6 (L) 03/30/2017    HCT 35.6 (L) 03/30/2017    MCV 91.0 03/30/2017     03/30/2017     Lab Results   Component Value Date    GLUCOSE 102 (H) 03/30/2017    BUN 22 (H) 03/30/2017    CREATININE 0.94 03/30/2017    EGFRIFNONA 80 03/30/2017    BCR 23.4 03/30/2017    CO2 23.0 03/30/2017    CALCIUM 8.4 03/30/2017    ALBUMIN 4.30 03/27/2017    AST 27 03/27/2017    ALT 26 03/27/2017     Lab Results   Component Value Date    CHOL 125 03/30/2017     Lab Results   Component Value Date    TRIG 59 03/30/2017    TRIG 93 10/09/2015     Lab Results   Component Value Date    HDL 47 (L) 03/30/2017     No components found for: LDLCALC  Lab Results   Component Value Date    LDL 56 03/30/2017    LDL 65 10/09/2015     No results found for: HDLLDLRATIO  No components found for: CHOLHDL  No results found for: HGBA1C  Lab Results   Component Value Date    TSH 2.900 03/27/2017           ASSESSMENT AND PLAN  Mr. Baer is  progressing well with no clinical evidence of angina, arrhythmia or congestive heart failure.  His lipid profiles are in the normal range.   Antiplatelet therapy with aspirin and Plavix, antianginal therapy with Ranexa, lipid-lowering therapy with simvastatin and antihypertensive therapy with lisinopril have been continued.     Diagnoses and all orders for this visit:    1. H/O cardiac arrest (Primary)    2. S/P PTCA (percutaneous transluminal coronary angioplasty)    3. Mixed hyperlipidemia    4. Chronic coronary artery disease    Other orders  -     ticagrelor (Brilinta) 60 MG tablet tablet; Take 1 tablet by mouth 2 (Two) Times a Day.  Dispense: 180 tablet; Refill: 3        Patient's Body mass index is 35.67 kg/m². BMI is above normal parameters. Recommendations include: exercise counseling and nutrition counseling.  Patient is a non-smoker.      Javi Landry MD  5/27/2022  16:49 CDT

## 2022-07-11 DIAGNOSIS — I25.10 CHRONIC CORONARY ARTERY DISEASE: ICD-10-CM

## 2022-07-11 RX ORDER — SIMVASTATIN 20 MG
20 TABLET ORAL NIGHTLY
Qty: 90 TABLET | Refills: 3 | Status: SHIPPED | OUTPATIENT
Start: 2022-07-11

## 2022-07-11 RX ORDER — PANTOPRAZOLE SODIUM 40 MG/1
TABLET, DELAYED RELEASE ORAL
Qty: 90 TABLET | Refills: 3 | Status: SHIPPED | OUTPATIENT
Start: 2022-07-11

## 2022-09-02 RX ORDER — RANOLAZINE 500 MG/1
500 TABLET, EXTENDED RELEASE ORAL 2 TIMES DAILY
Qty: 180 TABLET | Refills: 3 | Status: SHIPPED | OUTPATIENT
Start: 2022-09-02

## 2022-12-19 ENCOUNTER — OFFICE VISIT (OUTPATIENT)
Dept: CARDIOLOGY | Facility: CLINIC | Age: 72
End: 2022-12-19

## 2022-12-19 VITALS
DIASTOLIC BLOOD PRESSURE: 72 MMHG | TEMPERATURE: 96.9 F | HEART RATE: 52 BPM | HEIGHT: 66 IN | OXYGEN SATURATION: 98 % | BODY MASS INDEX: 33.23 KG/M2 | SYSTOLIC BLOOD PRESSURE: 130 MMHG | WEIGHT: 206.8 LBS

## 2022-12-19 DIAGNOSIS — I25.10 CHRONIC CORONARY ARTERY DISEASE: Primary | ICD-10-CM

## 2022-12-19 DIAGNOSIS — E78.2 MIXED HYPERLIPIDEMIA: ICD-10-CM

## 2022-12-19 DIAGNOSIS — R00.1 SINUS BRADYCARDIA: ICD-10-CM

## 2022-12-19 DIAGNOSIS — I35.1 NONRHEUMATIC AORTIC VALVE INSUFFICIENCY: ICD-10-CM

## 2022-12-19 DIAGNOSIS — Z86.74 H/O CARDIAC ARREST: ICD-10-CM

## 2022-12-19 PROCEDURE — 99214 OFFICE O/P EST MOD 30 MIN: CPT | Performed by: INTERNAL MEDICINE

## 2022-12-19 PROCEDURE — 93000 ELECTROCARDIOGRAM COMPLETE: CPT | Performed by: INTERNAL MEDICINE

## 2022-12-19 NOTE — PROGRESS NOTES
Justin Baer  72 y.o. male    1. Chronic coronary artery disease    2. H/O cardiac arrest    3. Mixed hyperlipidemia    4. Sinus bradycardia    5. Nonrheumatic aortic valve insufficiency        History of Present Illness  Mr. Baer is here for follow-up of his above-stated problems. He has had history of PCI to left anterior descending coronary artery in 2010 in an acute MI/cardiac arrest setting, PCI to left circumflex coronary artery and obtuse marginal coronary artery in 2012 and PCI to ostial left circumflex coronary artery in 3/ 2017.  He does have asymptomatic sinus bradycardia and is tolerated a lower heart rate quite well.    He has progressed reasonably well and denied any chest pain or shortness of breath.  He has been compliant with his medications.  Understand that [facility] and a copy of the results will be obtained for our records.  The patient will bring us a copy of the results.  He has taken up a low stress job that keeps him busy.    EKG today showed sinus rhythm with heart rate of 52 bpm.  Mild sinus arrhythmia.  MA interval 194 ms and QTc interval 385 ms.  No significant change from previous EKGs.    Allergies   Allergen Reactions   • Anaprox [Naproxen Sodium] Other (See Comments)     Caused pt to pass out.   • Ibuprofen Other (See Comments)     Caused pt to pass out.   • Iodinated Diagnostic Agents    • Iodine          Past Medical History:   Diagnosis Date   • Angina pectoris (HCC)    • Coronary artery disease    • Dyspnea    • Essential hypertension, malignant    • Hyperlipidemia    • Kidney stone    • Myocardial infarction (HCC)    • Palpitations    • Renal stones    • Ventricular fibrillation (HCC)          Past Surgical History:   Procedure Laterality Date   • APPENDECTOMY     • CARDIAC CATHETERIZATION     • CARDIAC CATHETERIZATION N/A 3/29/2017    Procedure: Coronary angiography;  Surgeon: Bolivar Ochoa MD;  Location: [facility] CATH INVASIVE  LOCATION;  Service:    • CARDIAC CATHETERIZATION  3/29/2017    Procedure: Functional Flow [place];  Surgeon: Bolivar Ochoa MD;  Location: [facility] CATH INVASIVE LOCATION;  Service:    • COLONOSCOPY N/A 2020    Procedure: COLONOSCOPY;  Surgeon: Jose Carlos Balderas DO;  Location: [facility] ENDOSCOPY;  Service: Gastroenterology;  Laterality: N/A;   • CORONARY ANGIOPLASTY     • CORONARY STENT PLACEMENT     • HERNIA REPAIR     • KIDNEY STONE SURGERY     • ID RT/LT HEART CATHETERS N/A 3/29/2017    Procedure: Percutaneous Coronary Intervention;  Surgeon: Bolivar Ochoa MD;  Location: [facility] CATH INVASIVE LOCATION;  Service: Cardiovascular   • VASECTOMY           History reviewed. No pertinent family history.      Social History     Socioeconomic History   • Marital status:    Tobacco Use   • Smoking status: Former     Packs/day: 1.50     Years: 40.00     Pack years: 60.00     Types: Cigarettes     Start date:      Quit date:      Years since quittin.9   • Smokeless tobacco: Never   Substance and Sexual Activity   • Alcohol use: No   • Drug use: No   • Sexual activity: Defer         Current Outpatient Medications   Medication Sig Dispense Refill   • aspirin 81 MG EC tablet Take 81 mg by mouth Daily.     • lisinopril (PRINIVIL,ZESTRIL) 5 MG tablet Take 1 tablet by mouth Daily. 90 tablet 3   • multivitamin (THERAGRAN) tablet tablet Take  by mouth Daily.     • nitroglycerin (NITROSTAT) 0.4 MG SL tablet Place 1 tablet under the tongue Every 5 (Five) Minutes As Needed for Chest Pain for up to 3 doses. Take no more than 3 doses in 15 minutes. 20 tablet 11   • pantoprazole (PROTONIX) 40 MG EC tablet TAKE ONE TABLET BY MOUTH DAILY 90 tablet 3   • ranolazine (RANEXA) 500 MG 12 hr tablet Take 1 tablet by mouth 2 (Two) Times a Day. 180 tablet 3   • simvastatin (ZOCOR) 20 MG tablet Take 1 tablet by mouth Every Night. 90 tablet 3   • ticagrelor (Brilinta) 60 MG tablet tablet Take 1 tablet by mouth 2 (Two)  "Times a Day. 180 tablet 3     No current facility-administered medications for this visit.         OBJECTIVE    /72 (BP Location: Left arm, Patient Position: Sitting, Cuff Size: Adult)   Pulse 52   Temp 96.9 °F (36.1 °C)   Ht 167.6 cm (66\")   Wt 93.8 kg (206 lb 12.8 oz)   SpO2 98%   BMI 33.38 kg/m²         Review of Systems : The following systems are reviewed and no changes noted    Constitutional:  Denies recent weight loss, weight gain, fever or chills, no change in exercise tolerance     HENT:  hearing loss, no epistaxis, hoarseness, or difficulty speaking.     Eyes: Wears eyeglasses or contact lenses     Respiratory:  Denies dyspnea with exertion,no cough, wheezing, or hemoptysis.     Cardiovascular: Negative for palpations, chest pain, orthopnea, PND    Gastrointestinal:  Denies change in bowel habits, dyspepsia, ulcer disease, hematochezia, or melena.     Endocrine: Negative for cold intolerance, heat intolerance, polydipsia, polyphagia and polyuria.     Genitourinary: Negative.      Musculoskeletal: Denies any history of arthritic symptoms or back problems     Neurological:  Denies any history of recurrent headaches, strokes, TIA, or seizure disorder.     Hematological: Denies any food allergies, seasonal allergies, bleeding disorders, or lymphadenopathy.     Psychiatric/Behavioral: Denies any history of depression, substance abuse, or change in cognitive function.       Physical Exam : The following systems are reviewed and no changes noted    Constitutional: Cooperative, alert and oriented,  in no acute distress.     HENT:   Head: Normocephalic, normal hair patterns, no masses or tenderness.  Ears, Nose, and Throat: No gross abnormalities. No pallor or cyanosis.   Eyes: EOMS intact, PERRL, conjunctivae and lids unremarkable. Fundoscopic exam and visual fields not performed.   Neck: No palpable masses or adenopathy, no thyromegaly, no JVD, carotid pulses are full and equal bilaterally and " without  Bruits.     Cardiovascular: Regular rhythm, S1 and S2 normal, no S3 or S4.  No murmurs, gallops, or rubs detected.     Pulmonary/Chest: Chest: normal symmetry, normal respiratory excursion, no intercostal retraction, no use of accessory muscles.            Pulmonary: Normal breath sounds. No rales or ronchi.    Abdominal: Abdomen soft, bowel sounds normoactive, no masses, no hepatosplenomegaly, non-tender, no bruits.     Musculoskeletal: No deformities, clubbing, cyanosis, erythema, or edema observed.     Neurological: No gross motor or sensory deficits noted, affect appropriate, oriented to time, person, place.     Skin: Warm and dry to the touch, no apparent skin lesions or masses noted.     Psychiatric: He has a normal mood and affect. His behavior is normal. Judgment and thought content normal.         Procedures      Lab Results   Component Value Date    WBC 9.29 03/30/2017    HGB 12.6 (L) 03/30/2017    HCT 35.6 (L) 03/30/2017    MCV 91.0 03/30/2017     03/30/2017     Lab Results   Component Value Date    GLUCOSE 102 (H) 03/30/2017    BUN 22 (H) 03/30/2017    CREATININE 0.94 03/30/2017    EGFRIFNONA 80 03/30/2017    BCR 23.4 03/30/2017    CO2 23.0 03/30/2017    CALCIUM 8.4 03/30/2017    ALBUMIN 4.30 03/27/2017    AST 27 03/27/2017    ALT 26 03/27/2017     Lab Results   Component Value Date    CHOL 125 03/30/2017     Lab Results   Component Value Date    TRIG 59 03/30/2017    TRIG 93 10/09/2015     Lab Results   Component Value Date    HDL 47 (L) 03/30/2017     No components found for: LDLCALC  Lab Results   Component Value Date    LDL 56 03/30/2017    LDL 65 10/09/2015     No results found for: HDLLDLRATIO  No components found for: CHOLHDL  No results found for: HGBA1C  Lab Results   Component Value Date    TSH 2.900 03/27/2017           ASSESSMENT AND PLAN  Mr. Baer denies any cardiac symptoms at this time and no signs of angina or congestive heart failure was noted.  His longstanding sinus  bradycardia is asymptomatic.  He will bring us the results of his recent blood work and they will be reviewed.    Antiplatelet therapy with aspirin and Plavix, antianginal therapy with Ranexa, lipid-lowering therapy with simvastatin and antihypertensive therapy with lisinopril have been continued.     His last echocardiogram was more than 5 years prior and this showed normal LV function with mild aortic and tricuspid valve regurgitation.  To reassess this, and echocardiogram is being arranged.    Diagnoses and all orders for this visit:    1. Chronic coronary artery disease (Primary)  -     Adult Transthoracic Echo Complete w/ Color, Spectral and Contrast if Necessary Per Protocol; Future    2. H/O cardiac arrest  -     Adult Transthoracic Echo Complete w/ Color, Spectral and Contrast if Necessary Per Protocol; Future    3. Mixed hyperlipidemia    4. Sinus bradycardia  -     Adult Transthoracic Echo Complete w/ Color, Spectral and Contrast if Necessary Per Protocol; Future    5. Nonrheumatic aortic valve insufficiency  -     Adult Transthoracic Echo Complete w/ Color, Spectral and Contrast if Necessary Per Protocol; Future        Patient's Body mass index is 33.38 kg/m². BMI is above normal parameters. Recommendations include: exercise counseling and nutrition counseling.  Patient is a non-smoker.      Javi Landry MD  12/19/2022  11:01 CST

## 2022-12-20 LAB
QT INTERVAL: 414 MS
QTC INTERVAL: 385 MS

## 2023-03-15 RX ORDER — LISINOPRIL 5 MG/1
5 TABLET ORAL DAILY
Qty: 90 TABLET | Refills: 3 | Status: SHIPPED | OUTPATIENT
Start: 2023-03-15

## 2023-03-22 ENCOUNTER — TELEPHONE (OUTPATIENT)
Dept: CARDIOLOGY | Facility: CLINIC | Age: 73
End: 2023-03-22
Payer: MEDICARE

## 2023-03-22 NOTE — TELEPHONE ENCOUNTER
Pt notified and understood   Per Dr Sofia----- Message from Javi Landry MD sent at 3/21/2023  7:28 PM CDT -----  Echocardiogram within acceptable normal limits.  Aorta is dilated.  We will keep an eye on it  ----- Message -----  From: Javi Landry MD  Sent: 3/17/2023   4:53 PM CDT  To: Javi Landry MD

## 2023-06-19 ENCOUNTER — OFFICE VISIT (OUTPATIENT)
Dept: CARDIOLOGY | Facility: CLINIC | Age: 73
End: 2023-06-19
Payer: MEDICARE

## 2023-06-19 VITALS
HEART RATE: 56 BPM | BODY MASS INDEX: 34.13 KG/M2 | SYSTOLIC BLOOD PRESSURE: 128 MMHG | DIASTOLIC BLOOD PRESSURE: 68 MMHG | HEIGHT: 66 IN | WEIGHT: 212.4 LBS | TEMPERATURE: 97.3 F | OXYGEN SATURATION: 99 %

## 2023-06-19 DIAGNOSIS — R00.1 SINUS BRADYCARDIA: ICD-10-CM

## 2023-06-19 DIAGNOSIS — I25.10 CHRONIC CORONARY ARTERY DISEASE: Primary | ICD-10-CM

## 2023-06-19 DIAGNOSIS — Z86.74 H/O CARDIAC ARREST: ICD-10-CM

## 2023-06-19 DIAGNOSIS — I77.810 AORTIC ECTASIA, THORACIC: ICD-10-CM

## 2023-06-19 PROBLEM — I20.0 UNSTABLE ANGINA: Status: RESOLVED | Noted: 2017-05-03 | Resolved: 2023-06-19

## 2023-06-19 PROBLEM — E78.2 MIXED HYPERLIPIDEMIA: Status: RESOLVED | Noted: 2021-10-12 | Resolved: 2023-06-19

## 2023-06-19 PROCEDURE — 99214 OFFICE O/P EST MOD 30 MIN: CPT | Performed by: INTERNAL MEDICINE

## 2023-06-19 PROCEDURE — 1159F MED LIST DOCD IN RCRD: CPT | Performed by: INTERNAL MEDICINE

## 2023-06-19 PROCEDURE — 1160F RVW MEDS BY RX/DR IN RCRD: CPT | Performed by: INTERNAL MEDICINE

## 2023-06-19 NOTE — PROGRESS NOTES
Justin Baer  72 y.o. male    1. Chronic coronary artery disease    2. H/O cardiac arrest    3. Sinus bradycardia    4. Aortic ectasia, thoracic        History of Present Illness  Mr. Baer is here for follow-up of his above-stated problems. He has had history of PCI to left anterior descending coronary artery in 2010 in an acute MI/cardiac arrest setting, PCI to left circumflex coronary artery and obtuse marginal coronary artery in 2012 and PCI to ostial left circumflex coronary artery in 3/ 2017.  He does have asymptomatic sinus bradycardia and is tolerated a lower heart rate quite well.    He has progressed reasonably well and denies any cardiac symptoms at this time with no chest pain, shortness of breath or palpitation.  He has been physically quite active for the most part.  [facility] and his lipid profiles and CMP were within normal limits.    Echocardiogram in March 2023 showed:    Left ventricular systolic function is normal. Calculated left ventricular EF = 57% Left ventricular ejection fraction appears to be 56 - 60%.    Left ventricular diastolic function is consistent with (grade I) impaired relaxation.    Estimated right ventricular systolic pressure from tricuspid regurgitation is normal (<35 mmHg).    Moderate dilation of the aortic root is present.    No significant valve abnormalities     Clinical exam is unremarkable with normal heart rate and blood pressure.    Allergies   Allergen Reactions    Anaprox [Naproxen Sodium] Other (See Comments)     Caused pt to pass out.    Ibuprofen Other (See Comments)     Caused pt to pass out.    Iodinated Contrast Media     Iodine          Past Medical History:   Diagnosis Date    Angina pectoris     Coronary artery disease     Dyspnea     Essential hypertension, malignant     Hyperlipidemia     Kidney stone     Myocardial infarction     Palpitations     Renal stones     Ventricular fibrillation          Past Surgical  History:   Procedure Laterality Date    APPENDECTOMY      CARDIAC CATHETERIZATION      CARDIAC CATHETERIZATION N/A 3/29/2017    Procedure: Coronary angiography;  Surgeon: Bolivar Ochoa MD;  Location: [facility] CATH INVASIVE LOCATION;  Service:     CARDIAC CATHETERIZATION  3/29/2017    Procedure: Functional Flow [place];  Surgeon: Bolivar Ochoa MD;  Location: [facility] CATH INVASIVE LOCATION;  Service:     COLONOSCOPY N/A 2020    Procedure: COLONOSCOPY;  Surgeon: Jose Carlos Balderas DO;  Location: [facility] ENDOSCOPY;  Service: Gastroenterology;  Laterality: N/A;    CORONARY ANGIOPLASTY      CORONARY STENT PLACEMENT      HERNIA REPAIR      KIDNEY STONE SURGERY      SD RT/LT HEART CATHETERS N/A 3/29/2017    Procedure: Percutaneous Coronary Intervention;  Surgeon: Bolivar Ochoa MD;  Location: [facility] CATH INVASIVE LOCATION;  Service: Cardiovascular    VASECTOMY           History reviewed. No pertinent family history.      Social History     Socioeconomic History    Marital status:    Tobacco Use    Smoking status: Former     Packs/day: 1.50     Years: 40.00     Pack years: 60.00     Types: Cigarettes     Start date:      Quit date:      Years since quittin.4    Smokeless tobacco: Never   Substance and Sexual Activity    Alcohol use: No    Drug use: No    Sexual activity: Defer         Current Outpatient Medications   Medication Sig Dispense Refill    aspirin 81 MG EC tablet Take 1 tablet by mouth Daily.      lisinopril (PRINIVIL,ZESTRIL) 5 MG tablet Take 1 tablet by mouth Daily. 90 tablet 3    multivitamin (THERAGRAN) tablet tablet Take  by mouth Daily.      nitroglycerin (NITROSTAT) 0.4 MG SL tablet Place 1 tablet under the tongue Every 5 (Five) Minutes As Needed for Chest Pain for up to 3 doses. Take no more than 3 doses in 15 minutes. 20 tablet 11    pantoprazole (PROTONIX) 40 MG EC tablet TAKE ONE TABLET BY MOUTH DAILY 90 tablet 3    ranolazine (RANEXA) 500 MG 12 hr tablet Take 1  "tablet by mouth 2 (Two) Times a Day. 180 tablet 3    simvastatin (ZOCOR) 20 MG tablet Take 1 tablet by mouth Every Night. 90 tablet 3    ticagrelor (Brilinta) 60 MG tablet tablet Take 1 tablet by mouth 2 (Two) Times a Day. 180 tablet 3     No current facility-administered medications for this visit.         OBJECTIVE    /68   Pulse 56   Temp 97.3 °F (36.3 °C)   Ht 167.6 cm (66\")   Wt 96.3 kg (212 lb 6.4 oz)   SpO2 99%   BMI 34.28 kg/m²         Review of Systems : The following systems are reviewed and no changes noted    Constitutional:  Denies recent weight loss, weight gain, fever or chills, no change in exercise tolerance     HENT:  hearing loss, no epistaxis, hoarseness, or difficulty speaking.     Eyes: Wears eyeglasses or contact lenses     Respiratory:  Denies dyspnea with exertion,no cough, wheezing, or hemoptysis.     Cardiovascular: Negative for palpations, chest pain, orthopnea, PND    Gastrointestinal:  Denies change in bowel habits, dyspepsia, ulcer disease, hematochezia, or melena.     Endocrine: Negative for cold intolerance, heat intolerance, polydipsia, polyphagia and polyuria.     Genitourinary: Negative.      Musculoskeletal: Denies any history of arthritic symptoms or back problems     Neurological:  Denies any history of recurrent headaches, strokes, TIA, or seizure disorder.     Hematological: Denies any food allergies, seasonal allergies, bleeding disorders, or lymphadenopathy.     Psychiatric/Behavioral: Denies any history of depression, substance abuse, or change in cognitive function.       Physical Exam : The following systems are reviewed and no changes noted    Constitutional: Cooperative, alert and oriented,  in no acute distress.     HENT:   Head: Normocephalic, normal hair patterns, no masses or tenderness.  Ears, Nose, and Throat: No gross abnormalities. No pallor or cyanosis.   Eyes: EOMS intact, PERRL, conjunctivae and lids unremarkable. Fundoscopic exam and visual " fields not performed.   Neck: No palpable masses or adenopathy, no thyromegaly, no JVD, carotid pulses are full and equal bilaterally and without  Bruits.     Cardiovascular: Regular rhythm, S1 and S2 normal, no S3 or S4.  No murmurs, gallops, or rubs detected.     Pulmonary/Chest: Chest: normal symmetry, normal respiratory excursion, no intercostal retraction, no use of accessory muscles.            Pulmonary: Normal breath sounds. No rales or ronchi.    Abdominal: Abdomen soft, bowel sounds normoactive, no masses, no hepatosplenomegaly, non-tender, no bruits.     Musculoskeletal: No deformities, clubbing, cyanosis, erythema, or edema observed.     Neurological: No gross motor or sensory deficits noted, affect appropriate, oriented to time, person, place.     Skin: Warm and dry to the touch, no apparent skin lesions or masses noted.     Psychiatric: He has a normal mood and affect. His behavior is normal. Judgment and thought content normal.         Procedures      Lab Results   Component Value Date    WBC 9.29 03/30/2017    HGB 12.6 (L) 03/30/2017    HCT 35.6 (L) 03/30/2017    MCV 91.0 03/30/2017     03/30/2017     Lab Results   Component Value Date    GLUCOSE 102 (H) 03/30/2017    BUN 22 (H) 03/30/2017    CREATININE 0.94 03/30/2017    EGFRIFNONA 80 03/30/2017    BCR 23.4 03/30/2017    CO2 23.0 03/30/2017    CALCIUM 8.4 03/30/2017    ALBUMIN 4.30 03/27/2017    AST 27 03/27/2017    ALT 26 03/27/2017     Lab Results   Component Value Date    CHOL 125 03/30/2017     Lab Results   Component Value Date    TRIG 59 03/30/2017    TRIG 93 10/09/2015     Lab Results   Component Value Date    HDL 47 (L) 03/30/2017     No components found for: LDLCALC  Lab Results   Component Value Date    LDL 56 03/30/2017    LDL 65 10/09/2015     No results found for: HDLLDLRATIO  No components found for: CHOLHDL  No results found for: HGBA1C  Lab Results   Component Value Date    TSH 2.900 03/27/2017           ASSESSMENT AND  PLAN  Mr. Baer denies any cardiac symptoms at this time and no signs of angina or congestive heart failure was noted.  His longstanding sinus bradycardia is asymptomatic.  He is able to perform his activities of daily living.    Antiplatelet therapy with aspirin and Plavix, antianginal therapy with Ranexa, lipid-lowering therapy with simvastatin and antihypertensive therapy with lisinopril have been continued.     His ascending aorta size will be monitored closely.  He has been advised to watch his blood pressure closely.  BP was in the normal range today.    Diagnoses and all orders for this visit:    1. Chronic coronary artery disease (Primary)    2. H/O cardiac arrest    3. Sinus bradycardia    4. Aortic ectasia, thoracic        Patient's Body mass index is 34.28 kg/m². BMI is above normal parameters. Recommendations include: exercise counseling and nutrition counseling.  Patient is a non-smoker.      Javi Landry MD  6/19/2023  11:11 CDT

## 2023-07-24 DIAGNOSIS — I25.10 CHRONIC CORONARY ARTERY DISEASE: ICD-10-CM

## 2023-07-24 RX ORDER — SIMVASTATIN 20 MG
20 TABLET ORAL NIGHTLY
Qty: 90 TABLET | Refills: 3 | Status: SHIPPED | OUTPATIENT
Start: 2023-07-24

## 2023-08-07 RX ORDER — PANTOPRAZOLE SODIUM 40 MG/1
40 TABLET, DELAYED RELEASE ORAL DAILY
Qty: 90 TABLET | Refills: 3 | Status: SHIPPED | OUTPATIENT
Start: 2023-08-07

## (undated) DEVICE — GW PRESSUREWIRE X WIRELESS FFR 175CM

## (undated) DEVICE — SINGLE-USE BIOPSY FORCEPS: Brand: RADIAL JAW 4

## (undated) DEVICE — CATH F6 ST JL 4 100CM: Brand: SUPERTORQUE

## (undated) DEVICE — ST CVR PROB PULLUP ULTRASND 5X48IN

## (undated) DEVICE — INTRO SHEATH ART/FEM ENGAGE .038 6F12CM

## (undated) DEVICE — DEV INFL MONARCH 20ML

## (undated) DEVICE — TREK CORONARY DILATATION CATHETER 2.50 MM X 15 MM / RAPID-EXCHANGE: Brand: TREK

## (undated) DEVICE — ELECTRODE,RT,STRESS,FOAM,50PK: Brand: MEDLINE

## (undated) DEVICE — COPILOT KIT INCLUDES BLEEDBACK CONTROL VALVE / GUIDE WIRE INTRODUCER / TORQUE DEVICE: Brand: ACCESSORIES

## (undated) DEVICE — GW PERIPH GUIDERIGHT STD/J/TP PTFE/PCOAT SS 0.038IN 5X150CM

## (undated) DEVICE — CANN SMPL SOFTECH BIFLO ETCO2 A/M 7FT

## (undated) DEVICE — VSI MICRO-INTRODUCER KITS ARE INTENDED FOR USE IN PERCUTANEOUS INTRODUCTION OF UP TO A 0.018 INCH OR 0.038 INCH GUIDEWIRE OR CATHETER INTO THE VASCULAR SYSTEM FOLLOWING A SMALL GAUGE NEEDLE STICK.: Brand: VSI MICRO-INTRODUCER KIT

## (undated) DEVICE — NC TREK CORONARY DILATATION CATHETER 3.0 MM X 8 MM / RAPID-EXCHANGE: Brand: NC TREK

## (undated) DEVICE — PK CATH LAB 60

## (undated) DEVICE — 6F .070 XB LAD 3.5 100CM: Brand: VISTA BRITE TIP

## (undated) DEVICE — CATH F6 ST JR 4 100CM: Brand: SUPERTORQUE